# Patient Record
Sex: MALE | Race: WHITE | NOT HISPANIC OR LATINO
[De-identification: names, ages, dates, MRNs, and addresses within clinical notes are randomized per-mention and may not be internally consistent; named-entity substitution may affect disease eponyms.]

---

## 2017-04-24 ENCOUNTER — APPOINTMENT (OUTPATIENT)
Dept: NEUROLOGY | Facility: CLINIC | Age: 57
End: 2017-04-24

## 2017-04-24 VITALS
BODY MASS INDEX: 29.8 KG/M2 | WEIGHT: 220 LBS | HEIGHT: 72 IN | DIASTOLIC BLOOD PRESSURE: 68 MMHG | HEART RATE: 77 BPM | OXYGEN SATURATION: 98 % | SYSTOLIC BLOOD PRESSURE: 130 MMHG

## 2017-04-24 DIAGNOSIS — M54.5 LOW BACK PAIN: ICD-10-CM

## 2017-04-24 DIAGNOSIS — G89.29 LOW BACK PAIN: ICD-10-CM

## 2017-06-13 ENCOUNTER — RX RENEWAL (OUTPATIENT)
Age: 57
End: 2017-06-13

## 2017-07-12 ENCOUNTER — APPOINTMENT (OUTPATIENT)
Dept: PLASTIC SURGERY | Facility: CLINIC | Age: 57
End: 2017-07-12

## 2017-07-12 VITALS
SYSTOLIC BLOOD PRESSURE: 138 MMHG | HEART RATE: 84 BPM | BODY MASS INDEX: 30.24 KG/M2 | WEIGHT: 223 LBS | TEMPERATURE: 98.9 F | DIASTOLIC BLOOD PRESSURE: 87 MMHG

## 2017-07-12 DIAGNOSIS — Z80.1 FAMILY HISTORY OF MALIGNANT NEOPLASM OF TRACHEA, BRONCHUS AND LUNG: ICD-10-CM

## 2017-07-12 DIAGNOSIS — Z86.39 PERSONAL HISTORY OF OTHER ENDOCRINE, NUTRITIONAL AND METABOLIC DISEASE: ICD-10-CM

## 2017-07-12 DIAGNOSIS — Z87.39 PERSONAL HISTORY OF OTHER DISEASES OF THE MUSCULOSKELETAL SYSTEM AND CONNECTIVE TISSUE: ICD-10-CM

## 2017-07-26 ENCOUNTER — APPOINTMENT (OUTPATIENT)
Dept: NEUROLOGY | Facility: CLINIC | Age: 57
End: 2017-07-26

## 2018-07-16 ENCOUNTER — APPOINTMENT (OUTPATIENT)
Dept: ORTHOPEDIC SURGERY | Facility: CLINIC | Age: 58
End: 2018-07-16
Payer: COMMERCIAL

## 2018-07-16 VITALS — WEIGHT: 232 LBS | BODY MASS INDEX: 31.42 KG/M2 | HEIGHT: 72 IN

## 2018-07-16 VITALS — HEART RATE: 75 BPM | DIASTOLIC BLOOD PRESSURE: 79 MMHG | SYSTOLIC BLOOD PRESSURE: 146 MMHG

## 2018-07-16 PROCEDURE — 20550 NJX 1 TENDON SHEATH/LIGAMENT: CPT | Mod: LT

## 2018-07-16 PROCEDURE — 99203 OFFICE O/P NEW LOW 30 MIN: CPT | Mod: 25

## 2018-08-15 ENCOUNTER — APPOINTMENT (OUTPATIENT)
Dept: ORTHOPEDIC SURGERY | Facility: CLINIC | Age: 58
End: 2018-08-15

## 2018-09-07 ENCOUNTER — APPOINTMENT (OUTPATIENT)
Dept: ORTHOPEDIC SURGERY | Facility: CLINIC | Age: 58
End: 2018-09-07
Payer: COMMERCIAL

## 2018-09-07 VITALS
DIASTOLIC BLOOD PRESSURE: 81 MMHG | BODY MASS INDEX: 31.19 KG/M2 | WEIGHT: 230 LBS | HEART RATE: 72 BPM | SYSTOLIC BLOOD PRESSURE: 131 MMHG

## 2018-09-07 PROCEDURE — 20550 NJX 1 TENDON SHEATH/LIGAMENT: CPT | Mod: F5

## 2018-09-07 PROCEDURE — 99213 OFFICE O/P EST LOW 20 MIN: CPT | Mod: 25

## 2018-11-28 ENCOUNTER — APPOINTMENT (OUTPATIENT)
Dept: ORTHOPEDIC SURGERY | Facility: CLINIC | Age: 58
End: 2018-11-28
Payer: COMMERCIAL

## 2018-11-28 PROCEDURE — 99213 OFFICE O/P EST LOW 20 MIN: CPT | Mod: 25

## 2018-11-28 PROCEDURE — 20550 NJX 1 TENDON SHEATH/LIGAMENT: CPT | Mod: F5

## 2019-03-19 ENCOUNTER — APPOINTMENT (OUTPATIENT)
Dept: CARDIOLOGY | Facility: CLINIC | Age: 59
End: 2019-03-19
Payer: COMMERCIAL

## 2019-03-19 ENCOUNTER — NON-APPOINTMENT (OUTPATIENT)
Age: 59
End: 2019-03-19

## 2019-03-19 VITALS
DIASTOLIC BLOOD PRESSURE: 83 MMHG | BODY MASS INDEX: 30.88 KG/M2 | RESPIRATION RATE: 17 BRPM | TEMPERATURE: 97.9 F | HEIGHT: 72 IN | HEART RATE: 85 BPM | SYSTOLIC BLOOD PRESSURE: 124 MMHG | WEIGHT: 228 LBS | OXYGEN SATURATION: 96 %

## 2019-03-19 DIAGNOSIS — R00.2 PALPITATIONS: ICD-10-CM

## 2019-03-19 DIAGNOSIS — F41.0 PANIC DISORDER [EPISODIC PAROXYSMAL ANXIETY]: ICD-10-CM

## 2019-03-19 PROCEDURE — 99204 OFFICE O/P NEW MOD 45 MIN: CPT

## 2019-03-19 PROCEDURE — 93000 ELECTROCARDIOGRAM COMPLETE: CPT

## 2019-03-19 PROCEDURE — 93270 REMOTE 30 DAY ECG REV/REPORT: CPT

## 2019-03-19 NOTE — PHYSICAL EXAM
[General Appearance - Well Developed] : well developed [Normal Appearance] : normal appearance [Well Groomed] : well groomed [General Appearance - Well Nourished] : well nourished [No Deformities] : no deformities [General Appearance - In No Acute Distress] : no acute distress [Normal Conjunctiva] : the conjunctiva exhibited no abnormalities [Eyelids - No Xanthelasma] : the eyelids demonstrated no xanthelasmas [No Oral Pallor] : no oral pallor [No Oral Cyanosis] : no oral cyanosis [FreeTextEntry1] : JVp normal [Heart Rate And Rhythm] : heart rate and rhythm were normal [Murmurs] : no murmurs present [Heart Sounds] : normal S1 and S2 [Edema] : no peripheral edema present [Respiration, Rhythm And Depth] : normal respiratory rhythm and effort [Exaggerated Use Of Accessory Muscles For Inspiration] : no accessory muscle use [Auscultation Breath Sounds / Voice Sounds] : lungs were clear to auscultation bilaterally [] : no hepato-splenomegaly [Abdomen Soft] : soft [Abdomen Tenderness] : non-tender [Abnormal Walk] : normal gait [Abdomen Mass (___ Cm)] : no abdominal mass palpated [Gait - Sufficient For Exercise Testing] : the gait was sufficient for exercise testing [Nail Clubbing] : no clubbing of the fingernails [Cyanosis, Localized] : no localized cyanosis [Petechial Hemorrhages (___cm)] : no petechial hemorrhages

## 2019-03-19 NOTE — DISCUSSION/SUMMARY
[Patient] : the patient [Risks] : risks [Benefits] : benefits [Alternatives] : alternatives [FreeTextEntry1] : Recommend event recorder. Reduction of caffeine. Consider apple watch or similar device followup after above.

## 2019-03-19 NOTE — HISTORY OF PRESENT ILLNESS
[FreeTextEntry1] : Patient has a history of hypertension. He has anxiety and phobias a lot of stress. He drinks caffeine. 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 Denies chest pain or shortness of breath but complains that he has had episodes where his heart feels like butterflies and racing. There is no associated syncope or dizziness. He was evaluated by his PMD. Echocardiography which was essentially normal.

## 2019-03-19 NOTE — REVIEW OF SYSTEMS
[see HPI] : see HPI [Palpitations] : palpitations [Anxiety] : anxiety [Under Stress] : under stress [Negative] : Heme/Lymph

## 2019-12-03 ENCOUNTER — APPOINTMENT (OUTPATIENT)
Dept: CARDIOLOGY | Facility: CLINIC | Age: 59
End: 2019-12-03
Payer: COMMERCIAL

## 2019-12-03 ENCOUNTER — NON-APPOINTMENT (OUTPATIENT)
Age: 59
End: 2019-12-03

## 2019-12-03 VITALS
WEIGHT: 230 LBS | SYSTOLIC BLOOD PRESSURE: 133 MMHG | HEART RATE: 90 BPM | DIASTOLIC BLOOD PRESSURE: 88 MMHG | OXYGEN SATURATION: 98 % | BODY MASS INDEX: 31.15 KG/M2 | RESPIRATION RATE: 17 BRPM | HEIGHT: 72 IN

## 2019-12-03 PROCEDURE — 99215 OFFICE O/P EST HI 40 MIN: CPT

## 2019-12-03 PROCEDURE — 93000 ELECTROCARDIOGRAM COMPLETE: CPT

## 2019-12-03 NOTE — REVIEW OF SYSTEMS
[see HPI] : see HPI [Anxiety] : anxiety [Palpitations] : palpitations [Under Stress] : under stress [Negative] : Endocrine

## 2019-12-03 NOTE — DISCUSSION/SUMMARY
[Patient] : the patient [Risks] : risks [Benefits] : benefits [Alternatives] : alternatives [___ Month(s)] : [unfilled] month(s) [FreeTextEntry1] : Extended discussion with patient and wife. Discussed risks and benefits of oral anticoagulation. For now will remain on aspirin. Discussed risk factors for atrial fibrillation and lifestyle issues. Discussed referral for sleep study. Blood pressure control and weight loss.\par \par Also discussed pill in the pocket, antiarrhythmic drugs and ablation as possible future options. Questions addressed with patient and wife. Nuclear stress requested. Followup 2 months.

## 2019-12-03 NOTE — ASSESSMENT
[FreeTextEntry1] : 58-year-old man with new diagnosis of paroxysmal atrial fib. Hypertension. Obesity. Consider possibility of LAUREN.

## 2019-12-03 NOTE — PHYSICAL EXAM
[General Appearance - Well Developed] : well developed [Normal Appearance] : normal appearance [General Appearance - Well Nourished] : well nourished [Well Groomed] : well groomed [No Deformities] : no deformities [General Appearance - In No Acute Distress] : no acute distress [Normal Conjunctiva] : the conjunctiva exhibited no abnormalities [Eyelids - No Xanthelasma] : the eyelids demonstrated no xanthelasmas [No Oral Pallor] : no oral pallor [No Oral Cyanosis] : no oral cyanosis [Exaggerated Use Of Accessory Muscles For Inspiration] : no accessory muscle use [FreeTextEntry1] : JVp normal [Respiration, Rhythm And Depth] : normal respiratory rhythm and effort [Heart Rate And Rhythm] : heart rate and rhythm were normal [Auscultation Breath Sounds / Voice Sounds] : lungs were clear to auscultation bilaterally [Heart Sounds] : normal S1 and S2 [Murmurs] : no murmurs present [Edema] : no peripheral edema present [Abdomen Tenderness] : non-tender [Abdomen Soft] : soft [] : no hepato-splenomegaly [Abnormal Walk] : normal gait [Abdomen Mass (___ Cm)] : no abdominal mass palpated [Gait - Sufficient For Exercise Testing] : the gait was sufficient for exercise testing [Nail Clubbing] : no clubbing of the fingernails [Cyanosis, Localized] : no localized cyanosis [Petechial Hemorrhages (___cm)] : no petechial hemorrhages

## 2019-12-03 NOTE — REASON FOR VISIT
[Atrial Fibrillation] : atrial fibrillation [Follow-Up - From Hospitalization] : follow-up of a recent hospitalization for [Spouse] : spouse

## 2019-12-03 NOTE — HISTORY OF PRESENT ILLNESS
[FreeTextEntry1] : Developed palpitations was seen at a walk-in clinic referred to [facility]. Diagnosed with atrophic. Apparently reverted spontaneously.\par \par An echo with normal LV function possible inferolateral hypertrophy. Discharged with cortisone and aspirin see medication list.\par \mark Did have alcohol intermittently and periodically, but says is not a regular drinker.\par \par On questioning does have snoring at night and fatigue and tiredness during the day. Never diagnosed with sleep apnea.\par \par No chest pain or shortness of breath.\par \par Intermittent palpitations as was discussed during his previous visit with me.\mark \mark Now has an Apple watch so he can make recordings.

## 2019-12-16 ENCOUNTER — APPOINTMENT (OUTPATIENT)
Dept: CARDIOLOGY | Facility: CLINIC | Age: 59
End: 2019-12-16

## 2019-12-30 ENCOUNTER — APPOINTMENT (OUTPATIENT)
Dept: CARDIOLOGY | Facility: CLINIC | Age: 59
End: 2019-12-30
Payer: COMMERCIAL

## 2019-12-30 PROCEDURE — 93015 CV STRESS TEST SUPVJ I&R: CPT

## 2020-02-04 ENCOUNTER — APPOINTMENT (OUTPATIENT)
Dept: CARDIOLOGY | Facility: CLINIC | Age: 60
End: 2020-02-04

## 2020-03-05 ENCOUNTER — NON-APPOINTMENT (OUTPATIENT)
Age: 60
End: 2020-03-05

## 2020-03-05 ENCOUNTER — APPOINTMENT (OUTPATIENT)
Dept: CARDIOLOGY | Facility: CLINIC | Age: 60
End: 2020-03-05
Payer: COMMERCIAL

## 2020-03-05 VITALS
BODY MASS INDEX: 30.48 KG/M2 | DIASTOLIC BLOOD PRESSURE: 76 MMHG | SYSTOLIC BLOOD PRESSURE: 133 MMHG | HEIGHT: 72 IN | RESPIRATION RATE: 17 BRPM | OXYGEN SATURATION: 98 % | WEIGHT: 225 LBS | HEART RATE: 70 BPM

## 2020-03-05 PROCEDURE — 93000 ELECTROCARDIOGRAM COMPLETE: CPT

## 2020-03-05 PROCEDURE — 99214 OFFICE O/P EST MOD 30 MIN: CPT

## 2020-03-05 NOTE — ASSESSMENT
[FreeTextEntry1] : 59-year-old man with  paroxysmal atrial fib. Hypertension. Obesity. Considering  LAUREN work up.\par

## 2020-03-05 NOTE — HISTORY OF PRESENT ILLNESS
[FreeTextEntry1] : As per prior phone call had episode of initial fibrillation after dietary indiscretion and some alcohol. Episode lasted for 14 hours self resolved. He felt a little lightheaded when it happens no chest pain or shortness of breath no recurrence since that time. Has seen his primary physician blood pressure was normal at her office in the one teens systolic.

## 2020-03-05 NOTE — REASON FOR VISIT
[Follow-Up - From Hospitalization] : follow-up of a recent hospitalization for [Atrial Fibrillation] : atrial fibrillation [Hypertension] : hypertension [Spouse] : spouse

## 2020-03-05 NOTE — DISCUSSION/SUMMARY
[Patient] : the patient [Risks] : risks [Benefits] : benefits [Alternatives] : alternatives [___ Month(s)] : [unfilled] month(s) [FreeTextEntry1] : Discussed with patient and wife regarding atrial fibrillation additional p.r.n. medication for this he has A. fib episodes reduction of alcohol blood pressure control and workup for sleep apnea or questions addressed discussed as well issues related to antiarrhythmics ablation cardioversion. Previously discussed issues related to anticoagulation. Followup in several months.

## 2020-03-16 ENCOUNTER — APPOINTMENT (OUTPATIENT)
Dept: PULMONOLOGY | Facility: CLINIC | Age: 60
End: 2020-03-16

## 2020-04-14 ENCOUNTER — APPOINTMENT (OUTPATIENT)
Dept: CARDIOLOGY | Facility: CLINIC | Age: 60
End: 2020-04-14

## 2020-06-09 ENCOUNTER — APPOINTMENT (OUTPATIENT)
Dept: CARDIOLOGY | Facility: CLINIC | Age: 60
End: 2020-06-09
Payer: COMMERCIAL

## 2020-06-09 ENCOUNTER — NON-APPOINTMENT (OUTPATIENT)
Age: 60
End: 2020-06-09

## 2020-06-09 VITALS
DIASTOLIC BLOOD PRESSURE: 80 MMHG | BODY MASS INDEX: 31.15 KG/M2 | OXYGEN SATURATION: 94 % | RESPIRATION RATE: 17 BRPM | SYSTOLIC BLOOD PRESSURE: 132 MMHG | HEART RATE: 70 BPM | HEIGHT: 72 IN | TEMPERATURE: 97 F | WEIGHT: 230 LBS

## 2020-06-09 PROCEDURE — 93000 ELECTROCARDIOGRAM COMPLETE: CPT

## 2020-06-09 PROCEDURE — 99214 OFFICE O/P EST MOD 30 MIN: CPT

## 2020-06-09 NOTE — HISTORY OF PRESENT ILLNESS
[FreeTextEntry1] : Occasional racing heart, usually at night, AF by Apple watch. Self resolve. Feels anxious, no c/p dyspnea, syncope. Takes Cardizem.\par Admits to dietary non compliance, occasional ETOH. No LAUREN evaluation yet.

## 2020-06-09 NOTE — PHYSICAL EXAM
[Normal Appearance] : normal appearance [General Appearance - Well Developed] : well developed [Well Groomed] : well groomed [No Deformities] : no deformities [General Appearance - Well Nourished] : well nourished [General Appearance - In No Acute Distress] : no acute distress [Normal Conjunctiva] : the conjunctiva exhibited no abnormalities [Eyelids - No Xanthelasma] : the eyelids demonstrated no xanthelasmas [No Oral Pallor] : no oral pallor [No Oral Cyanosis] : no oral cyanosis [FreeTextEntry1] : JVP normal [Respiration, Rhythm And Depth] : normal respiratory rhythm and effort [Exaggerated Use Of Accessory Muscles For Inspiration] : no accessory muscle use [Auscultation Breath Sounds / Voice Sounds] : lungs were clear to auscultation bilaterally [Heart Sounds] : normal S1 and S2 [Heart Rate And Rhythm] : heart rate and rhythm were normal [Murmurs] : no murmurs present [Edema] : no peripheral edema present [Abdomen Soft] : soft [Abdomen Tenderness] : non-tender [] : no hepato-splenomegaly [Abnormal Walk] : normal gait [Abdomen Mass (___ Cm)] : no abdominal mass palpated [Nail Clubbing] : no clubbing of the fingernails [Gait - Sufficient For Exercise Testing] : the gait was sufficient for exercise testing [Cyanosis, Localized] : no localized cyanosis [Petechial Hemorrhages (___cm)] : no petechial hemorrhages

## 2020-06-09 NOTE — DISCUSSION/SUMMARY
[Patient] : the patient [Benefits] : benefits [Risks] : risks [Alternatives] : alternatives [___ Month(s)] : [unfilled] month(s) [FreeTextEntry1] : d/w patient regarding PAF, additional Cardizem dosing PRN, ER visit if still symptomatic.\par D/w patient regarding AAD therapy and also option of ablation.\par Discussed oral a/c his CH ADS VASC score is 1, and he is reluctant to take oral a/c.\par weight loss/LAUREN evaluation again recommended.\par f/u 4 months

## 2020-06-09 NOTE — ASSESSMENT
[FreeTextEntry1] : 59-year-old man with  paroxysmal atrial fib. Hypertension. Obesity. Anxiety.\par

## 2020-06-09 NOTE — REASON FOR VISIT
[Follow-Up - From Hospitalization] : follow-up of a recent hospitalization for [Atrial Fibrillation] : atrial fibrillation [Hypertension] : hypertension

## 2020-06-15 ENCOUNTER — RX RENEWAL (OUTPATIENT)
Age: 60
End: 2020-06-15

## 2020-06-22 NOTE — PHYSICAL EXAM
[General Appearance - Well Developed] : well developed [Normal Appearance] : normal appearance [Well Groomed] : well groomed [General Appearance - Well Nourished] : well nourished [No Deformities] : no deformities [General Appearance - In No Acute Distress] : no acute distress [Normal Conjunctiva] : the conjunctiva exhibited no abnormalities [Eyelids - No Xanthelasma] : the eyelids demonstrated no xanthelasmas Monthly or less [No Oral Pallor] : no oral pallor [No Oral Cyanosis] : no oral cyanosis [Respiration, Rhythm And Depth] : normal respiratory rhythm and effort [Exaggerated Use Of Accessory Muscles For Inspiration] : no accessory muscle use [Auscultation Breath Sounds / Voice Sounds] : lungs were clear to auscultation bilaterally [Heart Rate And Rhythm] : heart rate and rhythm were normal [Heart Sounds] : normal S1 and S2 [Murmurs] : no murmurs present [Edema] : no peripheral edema present [Abdomen Soft] : soft [Abdomen Tenderness] : non-tender [] : no hepato-splenomegaly [Abdomen Mass (___ Cm)] : no abdominal mass palpated [Abnormal Walk] : normal gait [Gait - Sufficient For Exercise Testing] : the gait was sufficient for exercise testing [Nail Clubbing] : no clubbing of the fingernails [Cyanosis, Localized] : no localized cyanosis [Petechial Hemorrhages (___cm)] : no petechial hemorrhages [FreeTextEntry1] : JVp normal

## 2020-07-27 ENCOUNTER — APPOINTMENT (OUTPATIENT)
Dept: PULMONOLOGY | Facility: CLINIC | Age: 60
End: 2020-07-27
Payer: COMMERCIAL

## 2020-07-27 DIAGNOSIS — R06.83 SNORING: ICD-10-CM

## 2020-07-27 PROCEDURE — 99203 OFFICE O/P NEW LOW 30 MIN: CPT | Mod: GC,95

## 2020-07-27 NOTE — REASON FOR VISIT
[Initial Evaluation] : an initial evaluation [Medical Office: ([facility])___] : at the medical office located in  [Verbal consent obtained from patient] : the patient, [unfilled] [Home] : at home, [unfilled] , at the time of the visit.

## 2020-08-05 NOTE — REVIEW OF SYSTEMS
[Snoring] : snoring [A.M. Dry Mouth] : a.m. dry mouth [Palpitations] : palpitations [Chest Pain] : chest pain [Obesity] : obesity [Nocturia] : nocturia [Anxious] : anxious [Negative] : Psychiatric [Recent Wt Gain (___ Lbs)] : no recent weight gain [Witnessed Apneas] : no witnessed apnea [Diabetes] : no diabetes  [Thyroid Disease] : no thyroid disease [Shortness Of Breath] : no shortness of breath [Depression] : no depression [A.M. Headache] : no headache present upon awakening [Difficulty Initiating Sleep] : no difficulty falling asleep [Difficulty Maintaining Sleep] : no difficulty maintaining sleep [Acute Insomnia] : no acute insomnia [Irresistible urge to move legs] : no irresistible urge to move legs because of lower extremity discomfort [Chronic Insomnia] : no chronic  insomnia [Lower Extremity Discomfort] : no lower extremity discomfort [Hypersomnolence] : not sleeping much more than usual [Sleep Disturbances due to LE symptoms] : ~T no sleep disturbances due to lower extremity symptoms [Late day/ Evening symptoms] : no late day/evening symptoms [Unusual Sleep Behavior] : no unusual sleep behavior [Cataplexy] :  no cataplexy

## 2020-08-05 NOTE — ASSESSMENT
[FreeTextEntry1] : 59M hx Afib, HTN, HLD, obesity (BMI 31), who was referred by cardiology (Dr. Rider) because of pAfib. Diagnosed in 11/2019 after experiencing heart palpitations. Has hx of obesity, heavy snoring, and waking in the middle of the night with heart palpitations (Apple Watch confirms Afib). No EDS but takes daily nap for about 30 min everyday, feels refreshed afterwards. ESS 5. Given history of Atrial fibrillation especially waking him in the middle of the night, obesity and snoring, will need to rule out apneas as cause for arousals and subsequent Afib with in lab PSG. uncontrolled robles may contribute to afib recurrence.  \par The [facility] diagnostic polysomnography for further assessment. The ramifications of obstructive sleep apnea and its potential therapeutic modalities were discussed with the patient. The patient will followup after results of this study are available. \par \par \par Tracey Paulson MD\par Sleep Fellow

## 2020-08-05 NOTE — HISTORY OF PRESENT ILLNESS
[Snoring] : snoring [Frequent Nocturnal Awakening] : frequent nocturnal awakening [Daytime Somnolence] : daytime somnolence [Awakening With Dry Mouth] : awakening with dry mouth [ESS: ___] : ESS score [unfilled] [FreeTextEntry1] : 59M hx Afib, HTN, HLD, obesity (BMI 31), who was referred by cardiology (Dr. Rider) because of pAfib. 9/2019 had episodes of heart palpitations. Around Thanksgiving 2019, pt experienced persistent heart palpitations and went to  then admitted to hospital for 3 days, diagnosed with Afib. Started on diltiazem. Occasionally feels palpitations that wakes him up at night. Wife notes that he snores, no witnessed apneic episodes. \par \par Meds: asa, atorva, dilt, fenofibrate, losartan\par \par Sleep schedule: 1AM to bed, sleep latency <10 min, 1-2 nighttime awakenings - nocturia, wakes up at 6:30-7AM, takes 1 naps during the day for about 1/3 hour - feels refreshed afterwards; somewhat restorative sleep. On average sleeps 4-5 hours a night. No AM headaches. Wakes up with dry mouth, snoring, no witnessed apneic episodes. No choking/gasping at night but wakes up in the middle of the night with palpitations - Afib confirmed by Apple Watch. 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.\par \par Echo 3/2019: LA 3.6, normal LV, global sys function, EF 60-65%, normal RVSP, normal diastolic function [Witnessed Apneas] : no witnessed sleep apnea [Unintentional Sleep while Active] : no unintentional sleep while active [Unintentional Sleep While Inactive] : no unintentional sleep while inactive [Awakes Unrefreshed] : does not awake unrefreshed [Awakes with Headache] : no headache upon awakening [DIS] : no DIS [DMS] : no DMS [Recent  Weight Gain] : no recent weight gain [Hypersomnolence] : no hypersomnolence [Unusual Sleep Behavior] : no unusual sleep behavior [Sleep Paralysis] : no sleep paralysis [Cataplexy] : no cataplexy [Hypnopompic Hallucinations] : no hallucinations when awakening [Lower Extremity Discomfort] : no lower extremity discomfort in evening or at bedtime [Hypnagogic Hallucinations] : no hallucinations when falling asleep

## 2020-09-03 DIAGNOSIS — Z01.818 ENCOUNTER FOR OTHER PREPROCEDURAL EXAMINATION: ICD-10-CM

## 2020-09-05 ENCOUNTER — APPOINTMENT (OUTPATIENT)
Dept: DISASTER EMERGENCY | Facility: CLINIC | Age: 60
End: 2020-09-05

## 2020-09-06 LAB — SARS-COV-2 N GENE NPH QL NAA+PROBE: NOT DETECTED

## 2020-09-07 ENCOUNTER — FORM ENCOUNTER (OUTPATIENT)
Age: 60
End: 2020-09-07

## 2020-09-21 ENCOUNTER — NON-APPOINTMENT (OUTPATIENT)
Age: 60
End: 2020-09-21

## 2020-10-06 ENCOUNTER — NON-APPOINTMENT (OUTPATIENT)
Age: 60
End: 2020-10-06

## 2020-10-06 ENCOUNTER — APPOINTMENT (OUTPATIENT)
Dept: CARDIOLOGY | Facility: CLINIC | Age: 60
End: 2020-10-06
Payer: COMMERCIAL

## 2020-10-06 VITALS
DIASTOLIC BLOOD PRESSURE: 78 MMHG | OXYGEN SATURATION: 94 % | RESPIRATION RATE: 17 BRPM | HEIGHT: 72 IN | HEART RATE: 68 BPM | SYSTOLIC BLOOD PRESSURE: 134 MMHG | BODY MASS INDEX: 29.93 KG/M2 | WEIGHT: 221 LBS

## 2020-10-06 PROCEDURE — 99214 OFFICE O/P EST MOD 30 MIN: CPT

## 2020-10-06 PROCEDURE — 93000 ELECTROCARDIOGRAM COMPLETE: CPT

## 2020-10-06 NOTE — REVIEW OF SYSTEMS
[Recent Weight Loss (___ Lbs)] : recent [unfilled] ~Ulb weight loss [see HPI] : see HPI [Palpitations] : palpitations [Anxiety] : anxiety [Under Stress] : under stress [Negative] : Heme/Lymph

## 2020-10-06 NOTE — DISCUSSION/SUMMARY
[Patient] : the patient [Risks] : risks [Benefits] : benefits [Alternatives] : alternatives [___ Week(s)] : [unfilled] week(s) [FreeTextEntry1] : d/w patient regarding PAF, additional Cardizem dosing PRN, ER visit if still symptomatic.\par D/w patient regarding AAD therapy and also option of ablation.\par Discussed oral a/c his CH ADS VASC score is 1, and he is reluctant to take oral a/c.\par weight loss/  sleep f/u tomorrow\par

## 2020-10-06 NOTE — PHYSICAL EXAM
[General Appearance - Well Developed] : well developed [Normal Appearance] : normal appearance [Well Groomed] : well groomed [General Appearance - Well Nourished] : well nourished [No Deformities] : no deformities [General Appearance - In No Acute Distress] : no acute distress [Normal Conjunctiva] : the conjunctiva exhibited no abnormalities [Eyelids - No Xanthelasma] : the eyelids demonstrated no xanthelasmas [FreeTextEntry1] : JVP normal [Respiration, Rhythm And Depth] : normal respiratory rhythm and effort [Exaggerated Use Of Accessory Muscles For Inspiration] : no accessory muscle use [Auscultation Breath Sounds / Voice Sounds] : lungs were clear to auscultation bilaterally [Heart Rate And Rhythm] : heart rate and rhythm were normal [Heart Sounds] : normal S1 and S2 [Murmurs] : no murmurs present [Edema] : no peripheral edema present [Abdomen Soft] : soft [Abdomen Tenderness] : non-tender [] : no hepato-splenomegaly [Abdomen Mass (___ Cm)] : no abdominal mass palpated [Abnormal Walk] : normal gait [Gait - Sufficient For Exercise Testing] : the gait was sufficient for exercise testing [Nail Clubbing] : no clubbing of the fingernails [Cyanosis, Localized] : no localized cyanosis [Petechial Hemorrhages (___cm)] : no petechial hemorrhages

## 2020-10-06 NOTE — ASSESSMENT
[FreeTextEntry1] : 59-year-old man with  paroxysmal atrial fib. Hypertension. Obesity. Anxiety. LAUREN\par

## 2020-10-06 NOTE — HISTORY OF PRESENT ILLNESS
[FreeTextEntry1] : Multiple episodes of af, racing heart, usually at night. Last 1 minutes up to mulitple hours. Taking extra cardizem helps.\par No etoh.\par no c/p, sob\par having f/u tomorrow regarding LAUREN

## 2020-10-07 ENCOUNTER — APPOINTMENT (OUTPATIENT)
Dept: PULMONOLOGY | Facility: CLINIC | Age: 60
End: 2020-10-07
Payer: COMMERCIAL

## 2020-10-07 VITALS
SYSTOLIC BLOOD PRESSURE: 127 MMHG | RESPIRATION RATE: 17 BRPM | HEART RATE: 73 BPM | WEIGHT: 222.06 LBS | BODY MASS INDEX: 30.12 KG/M2 | TEMPERATURE: 98.1 F | DIASTOLIC BLOOD PRESSURE: 75 MMHG | OXYGEN SATURATION: 97 %

## 2020-10-07 PROCEDURE — 99214 OFFICE O/P EST MOD 30 MIN: CPT | Mod: GC

## 2020-10-08 NOTE — ASSESSMENT
[FreeTextEntry1] : 59M hx Afib, HTN, HLD, obesity (BMI 31), who comes for follow up of sleep results. PSG shows moderate sleep apnea with intermittent episodes of Afib.  He reports that most of his occurrences of atrial fibrillation have developed during the night.  Therefore because of his history of obstructive sleep apnea and its association with atrial fibrillation, pt would benefit from PAP therapy. The patient was referred to the [facility] sleep disorder center at [facility] for CPAP titration. The ramifications of obstructive sleep apnea and its potential therapeutic modalities were discussed with the patient. The dangers of drowsy driving were discussed with the patient. The patient was warned to avoid drowsy driving. The patient will followup after results of this study are available.

## 2020-10-08 NOTE — ASSESSMENT
[FreeTextEntry1] : 59M hx Afib, HTN, HLD, obesity (BMI 31), who comes for follow up of sleep results. PSG shows moderate sleep apnea with intermittent episodes of Afib.  He reports that most of his occurrences of atrial fibrillation have developed during the night.  Therefore because of his history of obstructive sleep apnea and its association with atrial fibrillation, pt would benefit from PAP therapy. The patient was referred to the [facility] sleep disorder center at [facility] for CPAP titration. The ramifications of obstructive sleep apnea and its potential therapeutic modalities were discussed with the patient. The dangers of drowsy driving were discussed with the patient. The patient was warned to avoid drowsy driving. The patient will followup after results of this study are available.

## 2020-10-08 NOTE — HISTORY OF PRESENT ILLNESS
[Spouse] : spouse [Obstructive Sleep Apnea] : obstructive sleep apnea [de-identified] : 58 yo M with history of Afib, HTN, HLD, obesity (BMI of 31) presented for follow-up after sleep study. \par He noted that he continued to awake several times at night, to urinate or with Afib. He did not have trouble falling asleep. He often took a small (~30 minutes) nap around 8 PM and went to bed a 1:30 AM. Last anxiety attack was 1 month ago. He still had some anxiety at times but less than when he was younger and he does not want to be on medication for anxiety.  [FreeTextEntry1] : 59M hx Afib, HTN, HLD, obesity (BMI 31), who comes for follow up of PSG results. Last seen 7/27/2020 as televisit. He was initial referred by cardiology because of paroxysmal Afib. Had sleep study to r/o uncontrolled LAUREN that may be contributing to Afib recurrence.  He reports that most episodes of atrial fibrillation have awakened him during the night.  Continues to have nocturia. [place] that he slept ok at the sleep lab. Usually delayed sleeper - goes to bed at around 1:30AM and gets up at 7AM. Naps at around 8PM. \par \par PSG 9/8/2020: AHI 20, mostly obstructive, hypopneas, T90 2%, EKG with brief periods of Afib

## 2020-10-08 NOTE — HISTORY OF PRESENT ILLNESS
[Spouse] : spouse [Obstructive Sleep Apnea] : obstructive sleep apnea [de-identified] : 58 yo M with history of Afib, HTN, HLD, obesity (BMI of 31) presented for follow-up after sleep study. \par He noted that he continued to awake several times at night, to urinate or with Afib. He did not have trouble falling asleep. He often took a small (~30 minutes) nap around 8 PM and went to bed a 1:30 AM. Last anxiety attack was 1 month ago. He still had some anxiety at times but less than when he was younger and he does not want to be on medication for anxiety.  [FreeTextEntry1] : 59M hx Afib, HTN, HLD, obesity (BMI 31), who comes for follow up of PSG results. Last seen 7/27/2020 as televisit. He was initial referred by cardiology because of paroxysmal Afib. Had sleep study to r/o uncontrolled LAUREN that may be contributing to Afib recurrence.  He reports that most episodes of atrial fibrillation have awakened him during the night.  Continues to have nocturia. [place] that he slept ok at the sleep lab. Usually delayed sleeper - goes to bed at around 1:30AM and gets up at 7AM. Naps at around 8PM. \par \par PSG 9/8/2020: AHI 20, mostly obstructive, hypopneas, T90 2%, EKG with brief periods of Afib

## 2020-10-08 NOTE — REVIEW OF SYSTEMS
[Snoring] : snoring [A.M. Dry Mouth] : a.m. dry mouth [Chest Pain] : chest pain [Palpitations] : palpitations [Obesity] : obesity [Nocturia] : nocturia [Anxious] : anxious [Negative] : Psychiatric [EDS: ESS=____] : no daytime somnolence [Fatigue] : no fatigue [Recent Wt Gain (___ Lbs)] : no recent weight gain [Witnessed Apneas] : no witnessed apnea [Shortness Of Breath] : no shortness of breath [Thyroid Disease] : no thyroid disease [Diabetes] : no diabetes  [A.M. Headache] : no headache present upon awakening [Depression] : no depression [Difficulty Initiating Sleep] : no difficulty falling asleep [Difficulty Maintaining Sleep] : no difficulty maintaining sleep [Acute Insomnia] : no acute insomnia [Chronic Insomnia] : no chronic  insomnia [Lower Extremity Discomfort] : no lower extremity discomfort [Irresistible urge to move legs] : no irresistible urge to move legs because of lower extremity discomfort [Late day/ Evening symptoms] : no late day/evening symptoms [Sleep Disturbances due to LE symptoms] : ~T no sleep disturbances due to lower extremity symptoms [Unusual Sleep Behavior] : no unusual sleep behavior [Hypersomnolence] : not sleeping much more than usual [Cataplexy] :  no cataplexy

## 2020-10-08 NOTE — PHYSICAL EXAM
[No Acute Distress] : no acute distress [No JVD] : no jugular venous distention [No Respiratory Distress] : no respiratory distress  [Normal Rate] : normal rate  [General Appearance - Well Developed] : well developed [General Appearance - Well Nourished] : well nourished [Neck Appearance] : the appearance of the neck was normal [Heart Rate And Rhythm] : heart rate was normal and rhythm regular [Heart Sounds] : normal S1 and S2 [] : no respiratory distress [Auscultation Breath Sounds / Voice Sounds] : lungs were clear to auscultation bilaterally [Bowel Sounds] : normal bowel sounds [Abdomen Soft] : soft [Abdomen Tenderness] : non-tender [Abnormal Walk] : normal gait [Nail Clubbing] : no clubbing of the fingernails [Cyanosis, Localized] : no localized cyanosis [Skin Color & Pigmentation] : normal skin color and pigmentation [Motor Exam] : the motor exam was normal [No Focal Deficits] : no focal deficits [Oriented To Time, Place, And Person] : oriented to person, place, and time

## 2020-11-24 ENCOUNTER — APPOINTMENT (OUTPATIENT)
Dept: CARDIOLOGY | Facility: CLINIC | Age: 60
End: 2020-11-24
Payer: COMMERCIAL

## 2020-11-24 VITALS
HEART RATE: 79 BPM | BODY MASS INDEX: 30.34 KG/M2 | WEIGHT: 224 LBS | RESPIRATION RATE: 16 BRPM | SYSTOLIC BLOOD PRESSURE: 128 MMHG | HEIGHT: 72 IN | OXYGEN SATURATION: 95 % | DIASTOLIC BLOOD PRESSURE: 74 MMHG | TEMPERATURE: 97.9 F

## 2020-11-24 PROCEDURE — 99214 OFFICE O/P EST MOD 30 MIN: CPT

## 2020-11-24 NOTE — REVIEW OF SYSTEMS
[Recent Weight Loss (___ Lbs)] : no recent weight loss [see HPI] : see HPI [Palpitations] : palpitations [Anxiety] : anxiety [Under Stress] : under stress [Negative] : Heme/Lymph

## 2020-11-24 NOTE — DISCUSSION/SUMMARY
[Patient] : the patient [Risks] : risks [Benefits] : benefits [Alternatives] : alternatives [___ Month(s)] : [unfilled] month(s) [FreeTextEntry1] : d/w patient regarding PAF, additional Cardizem dosing PRN, ER visit if still symptomatic.\par D/w patient regarding AAD therapy and also option of ablation.\par Discussed oral a/c his CH ADS VASC score is 1, and he is reluctant to take oral a/c.\par Doesn't want b blocker.\par Risks benefits and alternatives discussed with the patient questions addressed.\par Short acting cardizem prn. Record episodes on Apple watch.\par Start Multaq, rv 1 month.\par \par

## 2020-11-24 NOTE — HISTORY OF PRESENT ILLNESS
[FreeTextEntry1] : Goes for weeks without episodes, then has af episodes that can last hours, often but not always at night.\par No c/p synope, sob.\par Has sleep evaluation.\par Occasional brief "butterflies" sensation too.\par will take extra LA cardizem as needed.\par No etoh.

## 2020-12-22 ENCOUNTER — APPOINTMENT (OUTPATIENT)
Dept: CARDIOLOGY | Facility: CLINIC | Age: 60
End: 2020-12-22
Payer: COMMERCIAL

## 2020-12-22 ENCOUNTER — NON-APPOINTMENT (OUTPATIENT)
Age: 60
End: 2020-12-22

## 2020-12-22 VITALS
DIASTOLIC BLOOD PRESSURE: 78 MMHG | BODY MASS INDEX: 31.15 KG/M2 | TEMPERATURE: 97.6 F | HEART RATE: 72 BPM | OXYGEN SATURATION: 97 % | WEIGHT: 230 LBS | SYSTOLIC BLOOD PRESSURE: 127 MMHG | RESPIRATION RATE: 17 BRPM | HEIGHT: 72 IN

## 2020-12-22 PROCEDURE — 93000 ELECTROCARDIOGRAM COMPLETE: CPT

## 2020-12-22 PROCEDURE — 99072 ADDL SUPL MATRL&STAF TM PHE: CPT

## 2020-12-22 PROCEDURE — 99214 OFFICE O/P EST MOD 30 MIN: CPT

## 2020-12-22 NOTE — DISCUSSION/SUMMARY
[Patient] : the patient [Risks] : risks [Benefits] : benefits [Alternatives] : alternatives [___ Month(s)] : [unfilled] month(s) [FreeTextEntry1] : \par D/w patient regarding AAD therapy and also option of ablation.\par Discussed oral a/c his CH ADS VASC score is 1, and he is reluctant to take oral a/c.\par He wishes to continue current rx until next AF episode, might consider spending $ for multaq or have ablation.\par \par Risks benefits and alternatives discussed with the patient questions addressed.\par \par \par \par

## 2020-12-22 NOTE — REVIEW OF SYSTEMS
[Recent Weight Gain (___ Lbs)] : recent [unfilled] ~Ulb weight gain [Recent Weight Loss (___ Lbs)] : no recent weight loss [see HPI] : see HPI [Palpitations] : palpitations [Anxiety] : anxiety [Under Stress] : under stress [Negative] : Heme/Lymph

## 2020-12-22 NOTE — HISTORY OF PRESENT ILLNESS
[FreeTextEntry1] : Did not take multaq related to cost. Taking 180 mg diltiazem daily.\par 1episode of palpitations/af, self resolved about 2 weeks ago.

## 2021-04-05 ENCOUNTER — RX RENEWAL (OUTPATIENT)
Age: 61
End: 2021-04-05

## 2021-04-20 ENCOUNTER — APPOINTMENT (OUTPATIENT)
Dept: CARDIOLOGY | Facility: CLINIC | Age: 61
End: 2021-04-20
Payer: COMMERCIAL

## 2021-04-20 ENCOUNTER — NON-APPOINTMENT (OUTPATIENT)
Age: 61
End: 2021-04-20

## 2021-04-20 VITALS
OXYGEN SATURATION: 96 % | HEART RATE: 76 BPM | BODY MASS INDEX: 31.69 KG/M2 | DIASTOLIC BLOOD PRESSURE: 81 MMHG | SYSTOLIC BLOOD PRESSURE: 131 MMHG | WEIGHT: 234 LBS | RESPIRATION RATE: 16 BRPM | HEIGHT: 72 IN | TEMPERATURE: 98.1 F

## 2021-04-20 PROCEDURE — 93000 ELECTROCARDIOGRAM COMPLETE: CPT

## 2021-04-20 PROCEDURE — 99072 ADDL SUPL MATRL&STAF TM PHE: CPT

## 2021-04-20 PROCEDURE — 99214 OFFICE O/P EST MOD 30 MIN: CPT

## 2021-04-20 RX ORDER — LOSARTAN POTASSIUM 50 MG/1
50 TABLET, FILM COATED ORAL
Refills: 0 | Status: DISCONTINUED | COMMUNITY
End: 2021-04-20

## 2021-04-20 RX ORDER — ASPIRIN 81 MG/1
81 TABLET, CHEWABLE ORAL
Refills: 0 | Status: DISCONTINUED | COMMUNITY
End: 2021-04-20

## 2021-04-20 NOTE — PHYSICAL EXAM
[General Appearance - Well Developed] : well developed [Normal Appearance] : normal appearance [Well Groomed] : well groomed [General Appearance - Well Nourished] : well nourished [No Deformities] : no deformities [General Appearance - In No Acute Distress] : no acute distress [Normal Conjunctiva] : the conjunctiva exhibited no abnormalities [Eyelids - No Xanthelasma] : the eyelids demonstrated no xanthelasmas [FreeTextEntry1] : JVP normal [Respiration, Rhythm And Depth] : normal respiratory rhythm and effort [Auscultation Breath Sounds / Voice Sounds] : lungs were clear to auscultation bilaterally [Exaggerated Use Of Accessory Muscles For Inspiration] : no accessory muscle use [Heart Rate And Rhythm] : heart rate and rhythm were normal [Heart Sounds] : normal S1 and S2 [Murmurs] : no murmurs present [Edema] : no peripheral edema present [Abdomen Soft] : soft [Abdomen Tenderness] : non-tender [] : no hepato-splenomegaly [Abdomen Mass (___ Cm)] : no abdominal mass palpated [Abnormal Walk] : normal gait [Gait - Sufficient For Exercise Testing] : the gait was sufficient for exercise testing [Nail Clubbing] : no clubbing of the fingernails [Cyanosis, Localized] : no localized cyanosis [Petechial Hemorrhages (___cm)] : no petechial hemorrhages

## 2021-04-20 NOTE — HISTORY OF PRESENT ILLNESS
[FreeTextEntry1] : Seen for follow-up visit with his wife.\par \par Had interval COVID-19 infection initially had monoclonal antibody when was hospitalized had remdesivir treatment and desaturation did not require ICU care.  Did have episodes he believes of A. fib during the hospital stay.\par \par Is taking Multaq as directed gets occasional A. fib episodes.  No chest pain shortness of breath.  Is taking oral anticoagulation now.  Med list updated.

## 2021-04-20 NOTE — DISCUSSION/SUMMARY
[Patient] : the patient [Risks] : risks [Benefits] : benefits [Alternatives] : alternatives [___ Month(s)] : [unfilled] month(s) [FreeTextEntry1] : Reviewed and discussed with patient and wife.\par \par He will now agree to referral to electrophysiologist for consultation consideration of ablation.  He is moderately controlled on antiarrhythmic therapy and is taking anticoagulation at this time.\par \par We will continue same medication at this time cardiologically.\par Risks benefits and alternatives discussed with the patient questions addressed.\par \par \par \par

## 2021-07-20 ENCOUNTER — NON-APPOINTMENT (OUTPATIENT)
Age: 61
End: 2021-07-20

## 2021-07-20 ENCOUNTER — APPOINTMENT (OUTPATIENT)
Dept: CARDIOLOGY | Facility: CLINIC | Age: 61
End: 2021-07-20
Payer: COMMERCIAL

## 2021-07-20 VITALS
DIASTOLIC BLOOD PRESSURE: 75 MMHG | SYSTOLIC BLOOD PRESSURE: 133 MMHG | HEART RATE: 64 BPM | OXYGEN SATURATION: 96 % | HEIGHT: 72 IN | BODY MASS INDEX: 32.51 KG/M2 | WEIGHT: 240 LBS | RESPIRATION RATE: 17 BRPM | TEMPERATURE: 98 F

## 2021-07-20 PROCEDURE — 99214 OFFICE O/P EST MOD 30 MIN: CPT

## 2021-07-20 PROCEDURE — 93000 ELECTROCARDIOGRAM COMPLETE: CPT

## 2021-07-20 NOTE — REVIEW OF SYSTEMS
[Fever] : no fever [Weight Gain (___ Lbs)] : [unfilled] ~Ulb weight gain [Chills] : no chills [Palpitations] : palpitations [Negative] : Respiratory

## 2021-07-20 NOTE — CARDIOLOGY SUMMARY
[de-identified] : July 20, 2021 sinus rhythm [de-identified] : Stress test December 2019 no ischemia [de-identified] : Echo 2019 normal LV function

## 2021-07-20 NOTE — REASON FOR VISIT
[Arrhythmia/ECG Abnorrmalities] : arrhythmia/ECG abnormalities [Hypertension] : hypertension [Spouse] : spouse

## 2021-07-20 NOTE — HISTORY OF PRESENT ILLNESS
[FreeTextEntry1] : Patient seen for follow-up accompanied by his wife.  Has been under a lot of stress related to family situation.  Reports episodes of A. fib about 4 times in the past 6 weeks longest episodes last 1 to 2 hours.  He feels generally poorly during those episodes which self terminated.\par \par No chest pain shortness of breath.\par \par Has been active physically at work but admits to weight gain and dietary noncompliance.  Has not had follow-up regarding his sleep apnea.

## 2021-07-20 NOTE — ASSESSMENT
[FreeTextEntry1] : Paroxysmal atrial fibrillation on Multaq as antiarrhythmic therapy and Eliquis as anticoagulant.  Hypertension reasonable control.  Overweight.  Sleep apnea.

## 2021-07-20 NOTE — DISCUSSION/SUMMARY
[Risks] : risks [Patient] : the patient [Benefits] : benefits [Alternatives] : alternatives [___ Month(s)] : in [unfilled] month(s) [FreeTextEntry1] : Again discussed with patient and wife.  EP referral.  Weight loss diet discussed.  Use of additional Cardizem as needed prolonged episodes of A. fib with rapid rates.  Pulmonary follow-up regarding sleep apnea.  Questions addressed with patient and wife.

## 2021-07-25 ENCOUNTER — NON-APPOINTMENT (OUTPATIENT)
Age: 61
End: 2021-07-25

## 2021-10-20 ENCOUNTER — NON-APPOINTMENT (OUTPATIENT)
Age: 61
End: 2021-10-20

## 2021-10-20 ENCOUNTER — APPOINTMENT (OUTPATIENT)
Dept: ORTHOPEDIC SURGERY | Facility: CLINIC | Age: 61
End: 2021-10-20
Payer: COMMERCIAL

## 2021-10-20 VITALS
WEIGHT: 240 LBS | SYSTOLIC BLOOD PRESSURE: 135 MMHG | DIASTOLIC BLOOD PRESSURE: 75 MMHG | HEIGHT: 72 IN | HEART RATE: 64 BPM | BODY MASS INDEX: 32.51 KG/M2

## 2021-10-20 PROCEDURE — 99214 OFFICE O/P EST MOD 30 MIN: CPT | Mod: 25

## 2021-10-20 PROCEDURE — 20550 NJX 1 TENDON SHEATH/LIGAMENT: CPT | Mod: F3

## 2021-10-20 RX ORDER — BETAMETHA AC,SOD PHOS/WATER/PF 6 MG/ML
6 (3-3) VIAL (ML) INJECTION
Qty: 1 | Refills: 0 | Status: COMPLETED | OUTPATIENT
Start: 2021-10-20

## 2021-10-20 RX ORDER — LIDOCAINE HYDROCHLORIDE 10 MG/ML
1 INJECTION, SOLUTION INFILTRATION; PERINEURAL
Refills: 0 | Status: COMPLETED | OUTPATIENT
Start: 2021-10-20

## 2021-10-20 RX ADMIN — BETAMETHASONE SODIUM PHOSPHATE AND BETAMETHASONE ACETATE 1 MG/ML: 3; 3 INJECTION, SUSPENSION INTRA-ARTICULAR; INTRALESIONAL; INTRAMUSCULAR; SOFT TISSUE at 00:00

## 2021-10-20 RX ADMIN — LIDOCAINE HYDROCHLORIDE 0.5 %: 10 INJECTION, SOLUTION INFILTRATION; PERINEURAL at 00:00

## 2021-10-20 NOTE — ADDENDUM
[FreeTextEntry1] : I, Sherry Sánchez, acted solely as a scribe for Dr. Clement on this date on 10/20/2021.

## 2021-10-20 NOTE — PROCEDURE
[FreeTextEntry1] : -  After a discussion of risks and benefits, the patient agreed to proceed with a cortisone injection.  \par -  Side: Left\par -  Finger 1: Middle trigger finger\par -  Finger 2: Ring trigger finger\par -  Medications: 0.5 cc of 1% Lidocaine and 1 cc of betamethasone, 6mg/cc, using sterile technique.\par -  Patient tolerated the procedure well, without complications.\par -  Patient was told that the symptoms may worsen for a day or two, and should then begin to improve. \par -  Instructions: Patient was instructed on activity modification for the next several days.\par -  Follow-up: Within 4 weeks to assess response to the injection.

## 2021-10-20 NOTE — END OF VISIT
[FreeTextEntry3] : This note was written by Sherry Sánchez on 10/20/2021 acting solely as a scribe for Dr. Rebel Clement.\par  \par All medical record entries made by the Scribe were at my, Dr. Rebel Clement, direction and personally dictated by me on 10/20/2021. I have personally reviewed the chart and agree that the record accurately reflects my personal performance of the history, physical exam, assessment and plan.

## 2021-10-20 NOTE — HISTORY OF PRESENT ILLNESS
[FreeTextEntry1] : He comes in today for evaluation of the left ring and middle fingers. He notes he is unable to flex and extend the digits. He wore a brace without relief. The left ring finger was locking, so he splinted it and after five days of splinting he was unable to kimberly the digit and his pain increased.\par \par I treated hand almost 3 years ago for a right trigger thumb and left index finger trigger finger with 2 cortisone injections.  He is doing well in this regard.\par \par He is a pre-diabetic but its not treated for Diabetes.\par \par He is not COVID vaccinated and is unsure of whether he will be getting it in the next two weeks.

## 2021-10-20 NOTE — PHYSICAL EXAM
[de-identified] : - Constitutional: This is a male in no obvious distress.  \par - Psych: Patient is alert and oriented to person, place and time.  Patient has a normal mood and affect.\par - Cardiovascular: Normal pulses throughout the upper extremities.  No significant varicosities are noted in the upper extremities. \par - Neuro: Strength and sensation are intact throughout the upper extremities.  Patient has normal coordination.\par - Respiratory:  Patient exhibits no evidence of shortness of breath or difficulty breathing.\par - Skin: No rashes, lesions, or other abnormalities are noted in the upper extremities.\par \par ---\par \par Examination of his left ring finger demonstrate tenderness without swelling along the A1 pulley.  There is no obvious triggering.  He has limitation of flexion.  He has full extension.  There is swelling and less notable tenderness along the A1 pulley of the left middle finger.  There is triggering.  He has full flexion extension of the middle finger.  There is no triggering of the other digits.  He is neurovascularly intact distally.

## 2021-10-20 NOTE — DISCUSSION/SUMMARY
[FreeTextEntry1] : He has findings consistent with a left ring and middle finger trigger finger.\par \par I had a discussion regarding today's visit, the prognosis of this diagnosis and treatment recommendations and options.  At this time, he has agreed to proceed with cortisone injections into the left middle and ring fingers. \par \par The patient has agreed to this plan of management and has expressed full understanding.  All questions were fully answered to the patient's satisfaction.\par \par My cumulative time spent on this patient's visit included: Preparation for the visit, review of the medical records, review of pertinent diagnostic studies, examination and counseling of the patient on the above diagnosis, treatment plan and prognosis, orders of diagnostic tests, medications and/or appropriate procedures and documentation in the medical records of today's visit.

## 2021-10-31 ENCOUNTER — RESULT CHARGE (OUTPATIENT)
Age: 61
End: 2021-10-31

## 2021-11-01 ENCOUNTER — NON-APPOINTMENT (OUTPATIENT)
Age: 61
End: 2021-11-01

## 2021-11-01 ENCOUNTER — APPOINTMENT (OUTPATIENT)
Dept: ELECTROPHYSIOLOGY | Facility: CLINIC | Age: 61
End: 2021-11-01
Payer: COMMERCIAL

## 2021-11-01 VITALS
SYSTOLIC BLOOD PRESSURE: 146 MMHG | TEMPERATURE: 97.3 F | RESPIRATION RATE: 17 BRPM | WEIGHT: 238 LBS | DIASTOLIC BLOOD PRESSURE: 78 MMHG | HEART RATE: 72 BPM | OXYGEN SATURATION: 95 % | BODY MASS INDEX: 32.23 KG/M2 | HEIGHT: 72 IN

## 2021-11-01 DIAGNOSIS — Z78.9 OTHER SPECIFIED HEALTH STATUS: ICD-10-CM

## 2021-11-01 DIAGNOSIS — Z82.49 FAMILY HISTORY OF ISCHEMIC HEART DISEASE AND OTHER DISEASES OF THE CIRCULATORY SYSTEM: ICD-10-CM

## 2021-11-01 DIAGNOSIS — R42 DIZZINESS AND GIDDINESS: ICD-10-CM

## 2021-11-01 PROCEDURE — 99204 OFFICE O/P NEW MOD 45 MIN: CPT

## 2021-11-01 PROCEDURE — 93000 ELECTROCARDIOGRAM COMPLETE: CPT

## 2021-11-01 RX ORDER — ATORVASTATIN CALCIUM 40 MG/1
40 TABLET, FILM COATED ORAL
Qty: 30 | Refills: 0 | Status: ACTIVE | COMMUNITY

## 2021-11-02 ENCOUNTER — APPOINTMENT (OUTPATIENT)
Dept: CARDIOLOGY | Facility: CLINIC | Age: 61
End: 2021-11-02
Payer: COMMERCIAL

## 2021-11-02 VITALS
SYSTOLIC BLOOD PRESSURE: 126 MMHG | OXYGEN SATURATION: 94 % | TEMPERATURE: 98 F | BODY MASS INDEX: 31.54 KG/M2 | WEIGHT: 238 LBS | HEIGHT: 73 IN | HEART RATE: 61 BPM | DIASTOLIC BLOOD PRESSURE: 79 MMHG | RESPIRATION RATE: 17 BRPM

## 2021-11-02 PROCEDURE — 99215 OFFICE O/P EST HI 40 MIN: CPT

## 2021-11-02 NOTE — CARDIOLOGY SUMMARY
[de-identified] : 11/01/21: Sinus  Rhythm \par -RSR(V1) -nondiagnostic. \par  -Left atrial enlargement. \par

## 2021-11-02 NOTE — REVIEW OF SYSTEMS
[Fever] : no fever [Weight Gain (___ Lbs)] : no recent weight gain [Chills] : no chills [Palpitations] : palpitations [Under Stress] : under stress [Negative] : Respiratory

## 2021-11-02 NOTE — DISCUSSION/SUMMARY
[FreeTextEntry1] : The patient is a 60-year-old man who has had paroxysmal atrial fibrillation over the last 3 years.  It appears that his episodes are increasing in frequency as well as duration.  The patient is aware of the episodes and mostly feels the fluttering and a sense of not feeling well during episodes.\par \par His risk factors include obesity, obstructive sleep apnea and hypertension.  His blood pressure seems to be fairly well controlled on diltiazem.  He is not yet treated for sleep apnea.\par \par Echocardiogram from 2019 showed preserved left ventricular function, normal right and left atrial size and normal valvular function. \par  [id number] score 1.  He is currently on Eliquis and has had no bleeding issues.\par \par Patient is currently on diltiazem as well as Multaq.  He has not shown significant bradycardia.  He is not taking Multaq reliably.  \par \par His risk factors include stress/anxiety, increased BMI, alcohol use, obstructive sleep apnea without treatment, history of hypertension.  \par \par \par Options for treatment discussed with the patient: Catheter ablation versus antiarrhythmic medication.  The ablation procedure including the risk, successfully, recurrence rate, complication rates were all discussed.  The patient is not interested in proceeding ablation at this point but would consider it in the future.  He is very afraid of having the ablation done at this time. He would prefer to remain on antiarrhythmic.  We also discussed the benefits of aggressive risk factor modification including weight loss and treatment of the sleep apnea.  Patient will pursue treatment both of these risk factors.  Also discussed modification of his stress and anxiety which he will also proceed.\par \par We will consider other antiarrhythmics such as low-dose flecainide if the Multaq is not effective.\par Prior to initiation of flecainide we should rule out coronary disease and would recommend a coronary CT.  .\par I have ordered a coronary CT scan.  We will have a follow-up discussion regarding other antiarrhythmic after he is initiated treatment for sleep apnea and the results of the CT scan are available.\par

## 2021-11-02 NOTE — CARDIOLOGY SUMMARY
[de-identified] : July 20, 2021 sinus rhythm [de-identified] : Stress test December 2019 no ischemia [de-identified] : Echo 2019 normal LV function

## 2021-11-02 NOTE — HISTORY OF PRESENT ILLNESS
[FreeTextEntry1] : Follow-up visit accompanied by his wife.  Communicated about the case with Dr. Taran Fritz.  His note is also reviewed.  Patient with paroxysmal A. fib continues to have episodes about once weekly some can last up to several hours.  He is being considered for flecainide now currently taking Multaq but irregularly also diltiazem and Eliquis.\par \par No chest pain shortness of breath.  Has not followed up on sleep study.  Has stress related to son's medical condition

## 2021-11-02 NOTE — HISTORY OF PRESENT ILLNESS
[FreeTextEntry1] : \par \par Patient is a 60-year-old man who was seen in evaluation regarding his paroxysmal  atrial fibrillation.  He was accompanied by spouse.\par He has a prior history of hypertension.   There is no prior history of diabetes, stroke, TIA, thyroid disease or any known CAD.  His  [id number] score 1.  He has been on Eliquis. Patient has had significant amount of stress and anxiety.  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  His son was recently diagnosed with a mental illness and he is stressed over the situation.  \par His symptoms started around 2018-19: racing heart beat/palpitations. AF with RVR documented in 2019 - [facility] ER.  Episodes now last  as much as 14 hours.  He has had recordings by his apple watch.  Some of the episodes were associated with increase in alcohol consumption.  He has occasional dizziness and mild shortness of breath no chest pain, syncope, presyncope.\par \par Patient seems to get a lot of episodes in the afternoon and at night include some episodes awaken from sleeping.    He now gets these episodes during the day and they occur in random fashions.  Occasionally he takes the diltiazem and seems to make it better.\par He has been Multaq but has not been consistently taking it and has forgotten to take his dosages at times.  He might of had some improvement on the Multaq.\par \par He had uncomplicated Covid infection last year.\par Test reviewed:\par \par EKGs  from March 2020  to  November 1, 2021 showed sinus rhythm.\par \par Treadmill stress test performed on 12/30/2019 [facility] protocol exercise duration 9 minutes occasional VPCs no ischemic changes. No AF\par \par Echocardiogram performed 3/5/2019: LVEF 60 to 65%, normal left atrial size and volume.  Normal valvular function.  No pericardial effusion.\par \par Event monitor from 7/16/2019 reviewed sinus rhythm\par \par Sleep study performed on 9/8/2020. AHI 20. Mostly obstructive.  EKG showed brief periods of atrial fibrillation during the sleep study. He has not had CPAP treatment.\par \par He is not aware of a family history of A. fib.  He denies excessive caffeine intake.  Patient has moderate alcohol intake.

## 2021-11-02 NOTE — REVIEW OF SYSTEMS
[Weight Gain (___ Lbs)] : [unfilled] ~Ulb weight gain [Palpitations] : palpitations [Snoring] : snoring [Heartburn] : heartburn [Dizziness] : dizziness [Anxiety] : anxiety [Under Stress] : under stress [Fever] : no fever [Feeling Fatigued] : not feeling fatigued [Blurry Vision] : no blurred vision [Sore Throat] : no sore throat [SOB] : no shortness of breath [Dyspnea on exertion] : not dyspnea during exertion [Chest Discomfort] : no chest discomfort [Lower Ext Edema] : no extremity edema [Orthopnea] : no orthopnea [PND] : no PND [Syncope] : no syncope [Cough] : no cough [Wheezing] : no wheezing [Coughing Up Blood] : no hemoptysis [Abdominal Pain] : no abdominal pain [Hematuria] : no hematuria [Easy Bleeding] : no tendency for easy bleeding

## 2021-11-02 NOTE — PHYSICAL EXAM
[No Acute Distress] : no acute distress [Obese] : obese [Normal Conjunctiva] : normal conjunctiva [Normal Venous Pressure] : normal venous pressure [Normal S1, S2] : normal S1, S2 [No Murmur] : no murmur [No Rub] : no rub [No Gallop] : no gallop [Clear Lung Fields] : clear lung fields [Non Tender] : non-tender [Normal Gait] : normal gait [No Edema] : no edema [No Cyanosis] : no cyanosis [No Clubbing] : no clubbing [No Varicosities] : no varicosities [No Rash] : no rash [No Focal Deficits] : no focal deficits [Alert and Oriented] : alert and oriented

## 2021-11-02 NOTE — REASON FOR VISIT
[Arrhythmia/ECG Abnorrmalities] : arrhythmia/ECG abnormalities [Spouse] : spouse [FreeTextEntry3] : Dr. Ross Sandoval

## 2021-11-02 NOTE — DISCUSSION/SUMMARY
[Patient] : the patient [Risks] : risks [Benefits] : benefits [Alternatives] : alternatives [FreeTextEntry1] : Discussed again with patient and wife.  Reviewed EP consultation note.  Questions addressed with patient and wife.  We referred to Dr. Ma regarding his sleep apnea.  Discussed weight loss diet exercise.  He will have CTA to exclude significant CAD prior to consideration of flecainide.  Discussed risks and benefits in detail.  Continue Eliquis.  Continue Cardizem long-acting as well as as needed Cardizem.  Possible ablation in future discussed but he does not wish at this time.  Follow-up with me after above testing.

## 2021-11-15 ENCOUNTER — APPOINTMENT (OUTPATIENT)
Dept: PULMONOLOGY | Facility: CLINIC | Age: 61
End: 2021-11-15
Payer: COMMERCIAL

## 2021-11-15 PROCEDURE — 99215 OFFICE O/P EST HI 40 MIN: CPT | Mod: 95

## 2021-11-15 NOTE — PHYSICAL EXAM
[Normal Appearance] : normal appearance [General Appearance - In No Acute Distress] : no acute distress [] : no respiratory distress [Oriented To Time, Place, And Person] : oriented to person, place, and time [Exaggerated Use Of Accessory Muscles For Inspiration] : no accessory muscle use [Affect] : the affect was normal [Mood] : the mood was normal

## 2021-11-15 NOTE — REASON FOR VISIT
[Follow-Up Visit] : a follow-up visit for [Home] : at home, [unfilled] , at the time of the visit. [Medical Office: ([facility])___] : at the medical office located in  [Verbal consent obtained from patient] : the patient, [unfilled]

## 2021-11-16 NOTE — HISTORY OF PRESENT ILLNESS
[Snoring] : snoring [Frequent Nocturnal Awakening] : frequent nocturnal awakening [Awakes Unrefreshed] : awakening unrefreshed [Awakening With Dry Mouth] : awakening with dry mouth [Recent  Weight Gain] : recent weight gain [DMS] : DMS [To Bed: ___] : ~he/she~ goes to bed at [unfilled] [Arises: ___] : arises at [unfilled] [Sleep Onset Latency: ___ minutes] : sleep onset latency of [unfilled] minutes reported [Nocturnal Awakenings: ___] : ~he/she~ typically has [unfilled] nocturnal awakenings [WASO: ___] : Wake time after sleep onset is [unfilled] [TST: ___] : Total sleep time is [unfilled] [Daytime Sleep: ___] : daytime sleep: [unfilled] [FreeTextEntry1] : 61 y/o M, diagnosed with moderate LAUREN, severe in REM sleep --AHI 20/hr, REM AHI of 65.1/hr, with a T 90 of 2 %, comprised mostly of hypopneas, and obstructive apneas, with ECG showing normal sinus rhythm with periods of Afib, via PSG (9/8/2020), with comorbid A-fib, HTN, HLD, Anxiety, presents to obtain the CPAP Titration study ordered during his last visit in 10/2020. The patient presents today with reports of increased episodes of A-fib, occurring sporadically throughout the day, and wishes to undergo the CPAP titration study now. He denies nocturnal arousals with it. He has been stressed recently from family related matters. He is followed by cardiology. Since his last visit, the patient was hospitalized with COVID-19 in 4/2021, received Remdesivir, MAB. Feels back to baseline. He reports 18-20 lb weight gain since his last visit. \par \par The patient currently endorses occasional snoring, non-restorative sleep, and AM dry mouth. He continues to have nocturia. He denies headaches, nocturnal awakenings from gasping / choking sensations, somnolence, and drowsy driving.\par  Usually delayed sleeper --goes to bed around 1:30 AM, wakes up 5-hours later, remains awake for 1-hour browsing the internet, returns to sleep for another 45-60 minutes, arises by 7/7:30 am. Naps daily around 5 pm for 30-minutes, which is refreshing. \par Additional sleep related symptoms and sleep schedule is as indicated below. \par \par Of note, patient was told in the past that he may have a deviated nasal septum. Reports difficulty breathing through one of his nostrils for most of his life. He has not been evaluated by ENT in a long  time.  [Witnessed Apneas] : no witnessed sleep apnea [Unintentional Sleep while Active] : no unintentional sleep while active [Unintentional Sleep While Inactive] : no unintentional sleep while inactive [Awakes with Headache] : no headache upon awakening [Daytime Somnolence] : no daytime somnolence [DIS] : no DIS [Unusual Sleep Behavior] : no unusual sleep behavior [Hypersomnolence] : no hypersomnolence [Cataplexy] : no cataplexy [Sleep Paralysis] : no sleep paralysis [Hypnagogic Hallucinations] : no hallucinations when falling asleep [Hypnopompic Hallucinations] : no hallucinations when awakening [Lower Extremity Discomfort] : no lower extremity discomfort in evening or at bedtime [ESS] : 1

## 2021-11-16 NOTE — REVIEW OF SYSTEMS
[Right] : right [Fatigue] : fatigue [Recent Wt Gain (___ Lbs)] : recent [unfilled] ~Ulb weight gain [Nasal Congestion] : nasal congestion [Snoring] : snoring [A.M. Dry Mouth] : a.m. dry mouth [Obesity] : obesity [Nocturia] : nocturia [Anxious] : anxious [Negative] : Musculoskeletal [Witnessed Apneas] : no witnessed apnea [Chest Pain] : no chest pain [CHF] : no congestive heart failure [Thyroid Disease] : no thyroid disease [Diabetes] : no diabetes  [History of Iron Deficiency] : no history of iron deficiency [A.M. Headache] : no headache present upon awakening [Heartburn] : no heartburn [Depression] : no depression [FreeTextEntry9] : p-afib

## 2021-11-16 NOTE — ASSESSMENT
[FreeTextEntry1] : 61 y/o M, diagnosed with moderate LAUREN, severe in REM sleep --AHI 20/hr, REM AHI of 65.1/hr, with a T 90 of 2 %, comprised mostly of hypopneas, and obstructive apneas, with ECG showing normal sinus rhythm with periods of Afib, via PSG (9/8/2020), with comorbid A-fib, HTN, HLD, Anxiety, presents to obtain the CPAP Titration study ordered during his last visit in 10/2020. The patient presents today with reports of increased episodes of A-fib, occurring sporadically throughout the day, and wishes to undergo the CPAP titration study now. He denies nocturnal arousals from it. He has been under stress recently from family related matters. He is followed by cardiology. Since his last visit, the patient was hospitalized with COVID-19 in 4/2021, received Remdesivir, MAB. Feels back to baseline. He reports 18-20 lb weight gain since his last visit. The patient currently endorses occasional snoring, non-restorative sleep, and AM dry mouth. \par \par ----CPAP Titration study ordered with [facility] at [facility] as patient is familiar with this location. In-lab study is optimal  to assess for possible emergence of central apneas given his afib. \par ----Patient is aware that insurance may require a diagnostic sleep study within the year and is amenable to have a home sleep study if needed. \par ----The ramifications of untreated apneas and its association particularly to Afib was discussed at length with the patient. \par ----Weight loss encouraged. The role of obesity and weight loss as it relates to LAUREN was explained to the patient.\par \par ----Patient declined ENT referral for reported possible deviated nasal septum and lifelong difficulty breathing through one of his nostrils, despite verbalizing understanding that this may hinder tolerance to pap therapy. \par \par F/U after CPAP Titration study.

## 2021-11-17 ENCOUNTER — APPOINTMENT (OUTPATIENT)
Dept: ORTHOPEDIC SURGERY | Facility: CLINIC | Age: 61
End: 2021-11-17
Payer: COMMERCIAL

## 2021-11-17 VITALS — BODY MASS INDEX: 31.54 KG/M2 | WEIGHT: 238 LBS | HEIGHT: 73 IN

## 2021-11-17 PROCEDURE — 99213 OFFICE O/P EST LOW 20 MIN: CPT

## 2021-11-17 NOTE — ADDENDUM
[FreeTextEntry1] : I, Sherry Sánchez, acted solely as a scribe for Dr. Clement on this date on 11/17/2021.

## 2021-11-17 NOTE — DISCUSSION/SUMMARY
[FreeTextEntry1] : I had a discussion regarding today's visit, the diagnosis and treatment recommendations and options.  We also discussed changes since the last visit.  At this time, I told him that even if he had not had the COVID vaccination as of 3 days ago, I would recommend observation of his symptoms are they have begun to resolve. He will follow up in 4 weeks for a possible repeat cortisone injection if his symptoms persist.\par \par The patient has agreed to the above plan of management and has expressed full understanding.  All questions were fully answered to the patient's satisfaction.\par \par My cumulative time spent on today's visit was greater than 30 minutes and included: Preparation for the visit, review of the medical records, review of pertinent diagnostic studies, examination and counseling of the patient on the above diagnosis, treatment plan and prognosis, orders of diagnostic tests, medications and/or appropriate procedures and documentation in the medical records of today's visit.

## 2021-11-17 NOTE — HISTORY OF PRESENT ILLNESS
[FreeTextEntry1] : 4 weeks status post left middle and ring finger trigger cortisone injections #1.\par \par He notes stiffness to the hands in the morning but he does note improvements of about 80 percent since the initial cortisone injections.\par \par I treated hand almost 3 years ago for a right trigger thumb and left index finger trigger finger with 2 cortisone injections.  He is doing well in this regard.\par \par He is a pre-diabetic but its not treated for Diabetes.\par \par He was just vaccinated for COVID with [employer] as of Sunday 11/14/21.

## 2021-11-17 NOTE — END OF VISIT
[FreeTextEntry3] : This note was written by Sherry Sánchez on 11/17/2021 acting solely as a scribe for Dr. Rebel Clmeent.\par  \par All medical record entries made by the Scribe were at my, Dr. Rebel Clement, direction and personally dictated by me on 11/17/2021. I have personally reviewed the chart and agree that the record accurately reflects my personal performance of the history, physical exam, assessment and plan.

## 2021-11-23 ENCOUNTER — RX RENEWAL (OUTPATIENT)
Age: 61
End: 2021-11-23

## 2021-11-29 ENCOUNTER — APPOINTMENT (OUTPATIENT)
Dept: CT IMAGING | Facility: CLINIC | Age: 61
End: 2021-11-29
Payer: COMMERCIAL

## 2021-11-29 ENCOUNTER — OUTPATIENT (OUTPATIENT)
Dept: OUTPATIENT SERVICES | Facility: HOSPITAL | Age: 61
LOS: 1 days | End: 2021-11-29
Payer: COMMERCIAL

## 2021-11-29 DIAGNOSIS — I48.0 PAROXYSMAL ATRIAL FIBRILLATION: ICD-10-CM

## 2021-11-29 PROCEDURE — 82565 ASSAY OF CREATININE: CPT

## 2021-11-29 PROCEDURE — 75574 CT ANGIO HRT W/3D IMAGE: CPT

## 2021-11-29 PROCEDURE — 75574 CT ANGIO HRT W/3D IMAGE: CPT | Mod: 26

## 2021-12-14 ENCOUNTER — RX RENEWAL (OUTPATIENT)
Age: 61
End: 2021-12-14

## 2022-02-01 ENCOUNTER — APPOINTMENT (OUTPATIENT)
Dept: CARDIOLOGY | Facility: CLINIC | Age: 62
End: 2022-02-01
Payer: COMMERCIAL

## 2022-02-01 VITALS — TEMPERATURE: 98.7 F

## 2022-02-01 VITALS
HEIGHT: 73 IN | BODY MASS INDEX: 31.28 KG/M2 | WEIGHT: 236 LBS | DIASTOLIC BLOOD PRESSURE: 84 MMHG | SYSTOLIC BLOOD PRESSURE: 152 MMHG | HEART RATE: 72 BPM | OXYGEN SATURATION: 98 %

## 2022-02-01 VITALS — DIASTOLIC BLOOD PRESSURE: 80 MMHG | SYSTOLIC BLOOD PRESSURE: 138 MMHG

## 2022-02-01 DIAGNOSIS — R91.1 SOLITARY PULMONARY NODULE: ICD-10-CM

## 2022-02-01 PROCEDURE — 93000 ELECTROCARDIOGRAM COMPLETE: CPT

## 2022-02-01 PROCEDURE — 99215 OFFICE O/P EST HI 40 MIN: CPT

## 2022-02-01 NOTE — DISCUSSION/SUMMARY
[Patient] : the patient [Risks] : risks [Benefits] : benefits [Alternatives] : alternatives [___ Month(s)] : in [unfilled] month(s) [FreeTextEntry1] : Extended discussion with patient and wife.  He is to have pulmonary follow-up regarding his sleep apnea discussed Mediterranean diet exercise weight loss findings of fat related to his liver as may relate to his weight and prior alcohol use.  Pulmonary nodule follow-up by CT scanning.  Presence of nonobstructive CAD.  Lipids being obtained today may benefit from increasing lipid-lowering therapy.  Continue oral anticoagulation with Eliquis.  Follow-up with EP Dr. Tena.\par Call me for lab results later this week.  Follow-up with PMD.

## 2022-02-01 NOTE — REVIEW OF SYSTEMS
[Palpitations] : palpitations [Under Stress] : under stress [Negative] : Respiratory [Fever] : no fever [Weight Gain (___ Lbs)] : no recent weight gain [Chills] : no chills

## 2022-02-01 NOTE — ASSESSMENT
[FreeTextEntry1] : Paroxysmal atrial fibrillation on Multaq as antiarrhythmic therapy and Eliquis as anticoagulant.  Hypertension   Overweight.  Sleep apnea.  Nonobstructive CAD by CTA

## 2022-02-01 NOTE — CARDIOLOGY SUMMARY
[de-identified] : July 20, 2021 sinus rhythm [de-identified] : Stress test December 2019 no ischemia [de-identified] : Echo 2019 normal LV function [de-identified] : November 29, 2021   nonobstructive coronary disease 5 mm right middle lobe pulmonary nodule

## 2022-02-01 NOTE — HISTORY OF PRESENT ILLNESS
[FreeTextEntry1] : Follow-up visit accompanied by his wife.  \par \par \par \mark Has occasional episodes of racing heart with documented atrial fibrillation on his apple watch.  He had one episode while traveling on vacation in an airplane and rates were high he took diltiazem and rate come down to about 100.  Last episode was yesterday.\par \par He was undergone CTA which showed nonobstructive CAD.  He is scheduled for CPAP titration this weekend.\par \par He denied chest pain or dyspnea is not having any significant alcohol.

## 2022-02-01 NOTE — REASON FOR VISIT
Please advise  Trey Guerrero MA [Arrhythmia/ECG Abnorrmalities] : arrhythmia/ECG abnormalities [Other: ____] : [unfilled] [Spouse] : spouse [FreeTextEntry3] : Scarascia

## 2022-02-02 LAB
ALBUMIN SERPL ELPH-MCNC: 4.8 G/DL
ALP BLD-CCNC: 74 U/L
ALT SERPL-CCNC: 27 U/L
ANION GAP SERPL CALC-SCNC: 14 MMOL/L
AST SERPL-CCNC: 19 U/L
BASOPHILS # BLD AUTO: 0.07 K/UL
BASOPHILS NFR BLD AUTO: 0.8 %
BILIRUB SERPL-MCNC: 0.3 MG/DL
BUN SERPL-MCNC: 18 MG/DL
CALCIUM SERPL-MCNC: 10.1 MG/DL
CHLORIDE SERPL-SCNC: 102 MMOL/L
CHOLEST SERPL-MCNC: 197 MG/DL
CO2 SERPL-SCNC: 22 MMOL/L
CREAT SERPL-MCNC: 1.05 MG/DL
EOSINOPHIL # BLD AUTO: 0.24 K/UL
EOSINOPHIL NFR BLD AUTO: 2.8 %
GLUCOSE SERPL-MCNC: 140 MG/DL
HCT VFR BLD CALC: 48.5 %
HDLC SERPL-MCNC: 33 MG/DL
HGB BLD-MCNC: 16.4 G/DL
IMM GRANULOCYTES NFR BLD AUTO: 0.3 %
LDLC SERPL CALC-MCNC: 111 MG/DL
LYMPHOCYTES # BLD AUTO: 2.76 K/UL
LYMPHOCYTES NFR BLD AUTO: 31.9 %
MAN DIFF?: NORMAL
MCHC RBC-ENTMCNC: 30.6 PG
MCHC RBC-ENTMCNC: 33.8 GM/DL
MCV RBC AUTO: 90.5 FL
MONOCYTES # BLD AUTO: 0.8 K/UL
MONOCYTES NFR BLD AUTO: 9.2 %
NEUTROPHILS # BLD AUTO: 4.76 K/UL
NEUTROPHILS NFR BLD AUTO: 55 %
NONHDLC SERPL-MCNC: 164 MG/DL
PLATELET # BLD AUTO: 287 K/UL
POTASSIUM SERPL-SCNC: 4.2 MMOL/L
PROT SERPL-MCNC: 7.1 G/DL
RBC # BLD: 5.36 M/UL
RBC # FLD: 12.9 %
SODIUM SERPL-SCNC: 138 MMOL/L
TRIGL SERPL-MCNC: 265 MG/DL
TSH SERPL-ACNC: 1.39 UIU/ML
WBC # FLD AUTO: 8.66 K/UL

## 2022-02-11 ENCOUNTER — RX RENEWAL (OUTPATIENT)
Age: 62
End: 2022-02-11

## 2022-02-15 ENCOUNTER — NON-APPOINTMENT (OUTPATIENT)
Age: 62
End: 2022-02-15

## 2022-02-17 ENCOUNTER — NON-APPOINTMENT (OUTPATIENT)
Age: 62
End: 2022-02-17

## 2022-02-25 ENCOUNTER — NON-APPOINTMENT (OUTPATIENT)
Age: 62
End: 2022-02-25

## 2022-03-07 ENCOUNTER — RX RENEWAL (OUTPATIENT)
Age: 62
End: 2022-03-07

## 2022-04-04 ENCOUNTER — RX RENEWAL (OUTPATIENT)
Age: 62
End: 2022-04-04

## 2022-04-06 ENCOUNTER — APPOINTMENT (OUTPATIENT)
Dept: ORTHOPEDIC SURGERY | Facility: CLINIC | Age: 62
End: 2022-04-06
Payer: COMMERCIAL

## 2022-04-06 PROCEDURE — 20550 NJX 1 TENDON SHEATH/LIGAMENT: CPT | Mod: F3,LT

## 2022-04-06 PROCEDURE — 99214 OFFICE O/P EST MOD 30 MIN: CPT | Mod: 25

## 2022-04-06 RX ORDER — BETAMETHA AC,SOD PHOS/WATER/PF 6 MG/ML
6 (3-3) VIAL (ML) INJECTION
Qty: 1 | Refills: 0 | Status: COMPLETED | OUTPATIENT
Start: 2022-04-06

## 2022-04-06 RX ORDER — LIDOCAINE HYDROCHLORIDE 10 MG/ML
1 INJECTION, SOLUTION INFILTRATION; PERINEURAL
Refills: 0 | Status: COMPLETED | OUTPATIENT
Start: 2022-04-06

## 2022-04-06 RX ADMIN — LIDOCAINE HYDROCHLORIDE 0.5 %: 10 INJECTION, SOLUTION INFILTRATION; PERINEURAL at 00:00

## 2022-04-06 RX ADMIN — BETAMETHASONE SODIUM PHOSPHATE AND BETAMETHASONE ACETATE 1 MG/ML: 3; 3 INJECTION, SUSPENSION INTRA-ARTICULAR; INTRALESIONAL; INTRAMUSCULAR; SOFT TISSUE at 00:00

## 2022-04-06 NOTE — DISCUSSION/SUMMARY
[FreeTextEntry1] : I had a discussion regarding today's visit, the diagnosis and treatment recommendations and options.  We also discussed changes since the last visit.  At this time, he agreed to proceed with a repeat cortisone injection to the flexor tendon sheath of the left ring finger. He understands that this may not provide him with long term relief and if it does not, I would recommend he return for further treatment recommendations. \par \par The patient has agreed to the above plan of management and has expressed full understanding.  All questions were fully answered to the patient's satisfaction.\par \par My cumulative time spent on today's visit was greater than 30 minutes and included: Preparation for the visit, review of the medical records, review of pertinent diagnostic studies, examination and counseling of the patient on the above diagnosis, treatment plan and prognosis, orders of diagnostic tests, medications and/or appropriate procedures and documentation in the medical records of today's visit.

## 2022-04-06 NOTE — END OF VISIT
[FreeTextEntry3] : This note was written by Sherry Sánchez on 04/06/2022 acting solely as a scribe for Dr. Rebel Clement.\par  \par All medical record entries made by the Scribe were at my, Dr. Rebel Clement, direction and personally dictated by me on 04/06/2022. I have personally reviewed the chart and agree that the record accurately reflects my personal performance of the history, physical exam, assessment and plan.

## 2022-04-06 NOTE — PROCEDURE
[FreeTextEntry1] : -  After a discussion of risks and benefits, the patient agreed to proceed with a cortisone injection.  \par -  Side: Left\par -  Finger: Ring finger trigger finger\par -  Medications: 0.5 cc of 1% Lidocaine and 1 cc of betamethasone, using sterile technique.\par -  Patient tolerated the procedure well, without complications.\par -  Patient was told that the symptoms may worsen for a day or two, and should then begin to improve. \par -  Instructions: Patient was instructed on activity modification for the next several days.\par -  Follow-up: According to his symptoms.

## 2022-04-06 NOTE — ADDENDUM
[FreeTextEntry1] : I, Sherry Sánchez, acted solely as a scribe for Dr. Clement on this date on 04/06/2022.

## 2022-04-06 NOTE — HISTORY OF PRESENT ILLNESS
[FreeTextEntry1] : 5-1/2 months status post left middle and ring finger trigger cortisone injections #1.\par \par He returns today, with increased pain to the left ring finger. He states in the morning, his hands feel right and swollen as if they were caught in a door. \par \par I treated hand almost 3 years ago for a right trigger thumb and left index finger trigger finger with 2 cortisone injections.  He is doing well in this regard.\par \par He is a pre-diabetic but its not treated for Diabetes.\par \par He is accompanied by his wife today.

## 2022-04-18 ENCOUNTER — RX RENEWAL (OUTPATIENT)
Age: 62
End: 2022-04-18

## 2022-05-03 ENCOUNTER — APPOINTMENT (OUTPATIENT)
Dept: CARDIOLOGY | Facility: CLINIC | Age: 62
End: 2022-05-03
Payer: COMMERCIAL

## 2022-05-03 ENCOUNTER — NON-APPOINTMENT (OUTPATIENT)
Age: 62
End: 2022-05-03

## 2022-05-03 VITALS
WEIGHT: 229 LBS | DIASTOLIC BLOOD PRESSURE: 70 MMHG | BODY MASS INDEX: 31.02 KG/M2 | SYSTOLIC BLOOD PRESSURE: 128 MMHG | OXYGEN SATURATION: 97 % | HEIGHT: 72 IN | HEART RATE: 73 BPM

## 2022-05-03 VITALS — TEMPERATURE: 97.7 F

## 2022-05-03 DIAGNOSIS — U07.1 COVID-19: ICD-10-CM

## 2022-05-03 DIAGNOSIS — E78.00 PURE HYPERCHOLESTEROLEMIA, UNSPECIFIED: ICD-10-CM

## 2022-05-03 PROCEDURE — 93000 ELECTROCARDIOGRAM COMPLETE: CPT

## 2022-05-03 PROCEDURE — 93246 EXT ECG>7D<15D RECORDING: CPT

## 2022-05-03 PROCEDURE — 99214 OFFICE O/P EST MOD 30 MIN: CPT

## 2022-05-03 RX ORDER — DILTIAZEM HYDROCHLORIDE 180 MG/1
180 CAPSULE, EXTENDED RELEASE ORAL
Qty: 30 | Refills: 3 | Status: DISCONTINUED | COMMUNITY
Start: 2020-11-24 | End: 2022-05-03

## 2022-05-03 NOTE — REASON FOR VISIT
[Arrhythmia/ECG Abnorrmalities] : arrhythmia/ECG abnormalities [Hypertension] : hypertension [Spouse] : spouse [FreeTextEntry3] : Scarascia

## 2022-05-03 NOTE — DISCUSSION/SUMMARY
[Patient] : the patient [Risks] : risks [Benefits] : benefits [Alternatives] : alternatives [___ Month(s)] : in [unfilled] month(s) [FreeTextEntry1] : Counseled regarding diet and carbohydrates weight loss in light of finding of fatty liver on his cardiac CT.  Since he feels poorly will obtain blood work-up as above, will also arrange event recorder or prolonged Holter regarding his palpitations and paroxysmal A. fib.  He wishes to retry higher dose of Cardizem will increase to 240 mg daily.  Continue Eliquis.  Multiple questions addressed with patient.  Call me for event recorder results and lab results follow-up 4 to 6 weeks

## 2022-05-03 NOTE — CARDIOLOGY SUMMARY
[de-identified] : The third, 2022 sinus rhythm [de-identified] : Stress test December 2019 no ischemia [de-identified] : Echo 2019 normal LV function [de-identified] : November 2021  nonobstructive CAD

## 2022-05-03 NOTE — HISTORY OF PRESENT ILLNESS
[FreeTextEntry1] : Seen for follow-up accompanied by his wife.  Has good days and days he does not feel so well such as today.  In a nonspecific way he just does not feel right.  Denied fever chills cough sore throat chest pain GI upset diarrhea urinary symptoms.  Occasional palpitation but not prolonged.  Admits to anxiety.  Was told of fatty liver.  Admits to eating a lot of carbohydrates [employer] cookies cakes

## 2022-05-04 ENCOUNTER — APPOINTMENT (OUTPATIENT)
Dept: SLEEP CENTER | Facility: CLINIC | Age: 62
End: 2022-05-04

## 2022-05-04 LAB
ALBUMIN SERPL ELPH-MCNC: 5.1 G/DL
ALP BLD-CCNC: 83 U/L
ALT SERPL-CCNC: 28 U/L
ANION GAP SERPL CALC-SCNC: 12 MMOL/L
AST SERPL-CCNC: 21 U/L
BASOPHILS # BLD AUTO: 0.08 K/UL
BASOPHILS NFR BLD AUTO: 0.9 %
BILIRUB SERPL-MCNC: 0.4 MG/DL
BUN SERPL-MCNC: 15 MG/DL
CALCIUM SERPL-MCNC: 10.7 MG/DL
CHLORIDE SERPL-SCNC: 101 MMOL/L
CHOLEST SERPL-MCNC: 191 MG/DL
CO2 SERPL-SCNC: 25 MMOL/L
CREAT SERPL-MCNC: 1.12 MG/DL
EGFR: 74 ML/MIN/1.73M2
EOSINOPHIL # BLD AUTO: 0.18 K/UL
EOSINOPHIL NFR BLD AUTO: 2.1 %
GLUCOSE SERPL-MCNC: 99 MG/DL
HCT VFR BLD CALC: 51.4 %
HDLC SERPL-MCNC: 38 MG/DL
HGB BLD-MCNC: 16.8 G/DL
IMM GRANULOCYTES NFR BLD AUTO: 0.2 %
LDLC SERPL CALC-MCNC: 113 MG/DL
LYMPHOCYTES # BLD AUTO: 3.05 K/UL
LYMPHOCYTES NFR BLD AUTO: 35.1 %
MAGNESIUM SERPL-MCNC: 2.4 MG/DL
MAN DIFF?: NORMAL
MCHC RBC-ENTMCNC: 29.8 PG
MCHC RBC-ENTMCNC: 32.7 GM/DL
MCV RBC AUTO: 91.1 FL
MONOCYTES # BLD AUTO: 0.77 K/UL
MONOCYTES NFR BLD AUTO: 8.9 %
NEUTROPHILS # BLD AUTO: 4.58 K/UL
NEUTROPHILS NFR BLD AUTO: 52.8 %
NONHDLC SERPL-MCNC: 153 MG/DL
PLATELET # BLD AUTO: 280 K/UL
POTASSIUM SERPL-SCNC: 4.5 MMOL/L
PROT SERPL-MCNC: 7.5 G/DL
RBC # BLD: 5.64 M/UL
RBC # FLD: 12.7 %
SODIUM SERPL-SCNC: 139 MMOL/L
TRIGL SERPL-MCNC: 201 MG/DL
TSH SERPL-ACNC: 1.91 UIU/ML
WBC # FLD AUTO: 8.68 K/UL

## 2022-05-17 ENCOUNTER — NON-APPOINTMENT (OUTPATIENT)
Age: 62
End: 2022-05-17

## 2022-05-19 PROCEDURE — 93248 EXT ECG>7D<15D REV&INTERPJ: CPT

## 2022-06-09 ENCOUNTER — RX RENEWAL (OUTPATIENT)
Age: 62
End: 2022-06-09

## 2022-06-14 ENCOUNTER — APPOINTMENT (OUTPATIENT)
Dept: CARDIOLOGY | Facility: CLINIC | Age: 62
End: 2022-06-14
Payer: COMMERCIAL

## 2022-06-14 VITALS
SYSTOLIC BLOOD PRESSURE: 131 MMHG | OXYGEN SATURATION: 98 % | HEART RATE: 71 BPM | BODY MASS INDEX: 31.02 KG/M2 | HEIGHT: 72 IN | WEIGHT: 229 LBS | DIASTOLIC BLOOD PRESSURE: 78 MMHG

## 2022-06-14 DIAGNOSIS — I25.10 ATHEROSCLEROTIC HEART DISEASE OF NATIVE CORONARY ARTERY W/OUT ANGINA PECTORIS: ICD-10-CM

## 2022-06-14 PROCEDURE — 99214 OFFICE O/P EST MOD 30 MIN: CPT

## 2022-06-14 NOTE — HISTORY OF PRESENT ILLNESS
[FreeTextEntry1] : Seen for follow-up accompanied by his wife. \par \par Today says he feels great.  Has had a few episodes of palpitations and atrial fibrillation.  This was confirmed on event monitoring.  Tolerating medication.  Indicates he will see a physician regarding fatty liver.  Also has plans to follow-up regarding his sleep apnea.  Lab data was reviewed with them.

## 2022-06-14 NOTE — DISCUSSION/SUMMARY
[Patient] : the patient [Risks] : risks [Benefits] : benefits [Alternatives] : alternatives [___ Month(s)] : in [unfilled] month(s) [FreeTextEntry1] : Continue Cardizem  240 mg daily.  Continue Eliquis.  Multiple questions addressed with patient.  Discussed appropriate diet and weight loss consideration to adding Zetia to her regimen to lower cholesterol further.  Continue Multaq.  We will contact his physician regarding sleep apnea management and his primary care physician regarding noncardiac issues as above see me again in 4 months

## 2022-06-14 NOTE — ASSESSMENT
[FreeTextEntry1] : Paroxysmal atrial fibrillation on Multaq as antiarrhythmic therapy and Eliquis as anticoagulant.  Hypertension reasonable control.  Overweight.  Sleep apnea.\par \par Abnormal labs noted and discussed including elevated hematocrit which may be related to his sleep apnea and mild elevation of calcium.

## 2022-06-14 NOTE — CARDIOLOGY SUMMARY
[de-identified] : 2022 sinus rhythm [de-identified] : Stress test December 2019 no ischemia [de-identified] : Echo 2019 normal LV function [de-identified] : November 2021  nonobstructive CAD

## 2022-07-07 ENCOUNTER — RX RENEWAL (OUTPATIENT)
Age: 62
End: 2022-07-07

## 2022-07-20 ENCOUNTER — APPOINTMENT (OUTPATIENT)
Dept: ELECTROPHYSIOLOGY | Facility: CLINIC | Age: 62
End: 2022-07-20

## 2022-08-05 ENCOUNTER — RX RENEWAL (OUTPATIENT)
Age: 62
End: 2022-08-05

## 2022-08-18 ENCOUNTER — APPOINTMENT (OUTPATIENT)
Dept: HEPATOLOGY | Facility: CLINIC | Age: 62
End: 2022-08-18

## 2022-08-18 PROCEDURE — 91200 LIVER ELASTOGRAPHY: CPT

## 2022-09-05 ENCOUNTER — NON-APPOINTMENT (OUTPATIENT)
Age: 62
End: 2022-09-05

## 2022-09-22 ENCOUNTER — RX RENEWAL (OUTPATIENT)
Age: 62
End: 2022-09-22

## 2022-11-01 ENCOUNTER — APPOINTMENT (OUTPATIENT)
Dept: CARDIOLOGY | Facility: CLINIC | Age: 62
End: 2022-11-01

## 2022-11-01 ENCOUNTER — NON-APPOINTMENT (OUTPATIENT)
Age: 62
End: 2022-11-01

## 2022-11-01 VITALS
OXYGEN SATURATION: 97 % | SYSTOLIC BLOOD PRESSURE: 140 MMHG | BODY MASS INDEX: 32.1 KG/M2 | WEIGHT: 237 LBS | HEIGHT: 72 IN | HEART RATE: 68 BPM | DIASTOLIC BLOOD PRESSURE: 80 MMHG

## 2022-11-01 VITALS — SYSTOLIC BLOOD PRESSURE: 118 MMHG | DIASTOLIC BLOOD PRESSURE: 72 MMHG

## 2022-11-01 DIAGNOSIS — I25.10 ATHEROSCLEROTIC HEART DISEASE OF NATIVE CORONARY ARTERY W/OUT ANGINA PECTORIS: ICD-10-CM

## 2022-11-01 PROCEDURE — 93000 ELECTROCARDIOGRAM COMPLETE: CPT

## 2022-11-01 PROCEDURE — 99214 OFFICE O/P EST MOD 30 MIN: CPT | Mod: 25

## 2022-11-01 NOTE — DISCUSSION/SUMMARY
[Patient] : the patient [Risks] : risks [Benefits] : benefits [Alternatives] : alternatives [___ Month(s)] : in [unfilled] month(s) [FreeTextEntry1] : Continue Cardizem  240 mg daily.  Continue Eliquis.  Multiple questions addressed with patient.  Discussed appropriate diet and weight loss as well as exercise.  Continue Multaq.\par \par Should have repeat blood testing either with PMD or here next visit attention to lipids and chemistries. [EKG obtained to assist in diagnosis and management of assessed problem(s)] : EKG obtained to assist in diagnosis and management of assessed problem(s)

## 2022-11-01 NOTE — HISTORY OF PRESENT ILLNESS
[FreeTextEntry1] : Seen for follow-up accompanied by his wife. \par \par Indicates it went 8 weeks without any fibrillation episodes then had brief 1 more recently lasting about 1 minute at rate of 150.  No chest pain shortness of breath.  Has not been exercising.  Has not yet performed sleep study.  Claims to take medication as directed.  Admits to dietary nonadherence.

## 2022-11-01 NOTE — ASSESSMENT
[FreeTextEntry1] : Paroxysmal atrial fibrillation on Multaq as antiarrhythmic therapy and Eliquis as anticoagulant.  Hypertension reasonable control.  Overweight with recent weight gain.  Sleep apnea.\par \par

## 2022-11-01 NOTE — CARDIOLOGY SUMMARY
[de-identified] : November 1, 2022 sinus rhythm [de-identified] : Stress test December 2019 no ischemia [de-identified] : Echo 2019 normal LV function [de-identified] : November 2021  nonobstructive CAD

## 2022-11-01 NOTE — REVIEW OF SYSTEMS
[Weight Gain (___ Lbs)] : [unfilled] ~Ulb weight gain [SOB] : no shortness of breath [Palpitations] : palpitations [Syncope] : no syncope [Cough] : cough

## 2022-11-11 ENCOUNTER — NON-APPOINTMENT (OUTPATIENT)
Age: 62
End: 2022-11-11

## 2022-12-10 ENCOUNTER — NON-APPOINTMENT (OUTPATIENT)
Age: 62
End: 2022-12-10

## 2022-12-13 ENCOUNTER — RX RENEWAL (OUTPATIENT)
Age: 62
End: 2022-12-13

## 2023-01-17 ENCOUNTER — NON-APPOINTMENT (OUTPATIENT)
Age: 63
End: 2023-01-17

## 2023-02-14 ENCOUNTER — RX RENEWAL (OUTPATIENT)
Age: 63
End: 2023-02-14

## 2023-02-28 ENCOUNTER — APPOINTMENT (OUTPATIENT)
Dept: CARDIOLOGY | Facility: CLINIC | Age: 63
End: 2023-02-28
Payer: COMMERCIAL

## 2023-02-28 VITALS
HEART RATE: 79 BPM | OXYGEN SATURATION: 99 % | HEIGHT: 72 IN | WEIGHT: 237 LBS | SYSTOLIC BLOOD PRESSURE: 132 MMHG | DIASTOLIC BLOOD PRESSURE: 76 MMHG | BODY MASS INDEX: 32.1 KG/M2

## 2023-02-28 PROCEDURE — 99214 OFFICE O/P EST MOD 30 MIN: CPT

## 2023-02-28 NOTE — ASSESSMENT
[FreeTextEntry1] : Paroxysmal atrial fibrillation on Multaq as antiarrhythmic therapy and Eliquis as anticoagulant.  Hypertension reasonable control.   Sleep apnea.\par \par

## 2023-02-28 NOTE — CARDIOLOGY SUMMARY
[de-identified] : November 1, 2022 sinus rhythm [de-identified] : Stress test December 2019 no ischemia [de-identified] : Echo 2019 normal LV function [de-identified] : November 2021  nonobstructive CAD

## 2023-02-28 NOTE — DISCUSSION/SUMMARY
[Patient] : the patient [Risks] : risks [Benefits] : benefits [Alternatives] : alternatives [___ Month(s)] : in [unfilled] month(s) [FreeTextEntry1] : Continue Cardizem  240 mg daily.  Continue Eliquis.  Multiple questions addressed with patient.  Discussed appropriate diet and weight loss as well as exercise.  Continue Multaq.\par \par Should have repeat blood testing during his upcoming visit with PMD.  Follow-up with me in 3 to 4 months.  He establish pulmonary evaluation regarding sleep apnea in setting of A-fib.  Options discussed and questions addressed with patient and wife.

## 2023-02-28 NOTE — HISTORY OF PRESENT ILLNESS
[FreeTextEntry1] : Seen for follow-up accompanied by his wife. \par \par He has had less episodes of A-fib since last visit since increasing diltiazem.  Episodes can last up to 7 hours however.  He sometimes takes extra short acting diltiazem during the episodes.  No chest pain shortness of breath leg edema.  Has not followed up on sleep study.  Clines EKG today.

## 2023-03-13 ENCOUNTER — APPOINTMENT (OUTPATIENT)
Dept: ORTHOPEDIC SURGERY | Facility: CLINIC | Age: 63
End: 2023-03-13
Payer: COMMERCIAL

## 2023-03-13 PROCEDURE — 20550 NJX 1 TENDON SHEATH/LIGAMENT: CPT | Mod: RT

## 2023-03-13 PROCEDURE — 99214 OFFICE O/P EST MOD 30 MIN: CPT | Mod: 25

## 2023-03-13 RX ORDER — LIDOCAINE HYDROCHLORIDE 10 MG/ML
1 INJECTION, SOLUTION INFILTRATION; PERINEURAL
Refills: 0 | Status: COMPLETED | OUTPATIENT
Start: 2023-03-13

## 2023-03-13 RX ORDER — BETAMETHA AC,SOD PHOS/WATER/PF 6 MG/ML
6 (3-3) VIAL (ML) INJECTION
Qty: 1 | Refills: 0 | Status: COMPLETED | OUTPATIENT
Start: 2023-03-13

## 2023-03-13 RX ADMIN — BETAMETHASONE SODIUM PHOSPHATE AND BETAMETHASONE ACETATE 1 MG/ML: 3; 3 INJECTION, SUSPENSION INTRA-ARTICULAR; INTRALESIONAL; INTRAMUSCULAR; SOFT TISSUE at 00:00

## 2023-03-13 RX ADMIN — LIDOCAINE HYDROCHLORIDE 0.5 %: 10 INJECTION, SOLUTION INFILTRATION; PERINEURAL at 00:00

## 2023-03-13 NOTE — PHYSICAL EXAM
[de-identified] : - Constitutional: This is a male in no obvious distress.  He is accompanied by his wife today. \par - Psych: Patient is alert and oriented to person, place and time.  Patient has a normal mood and affect.\par - Cardiovascular: Normal pulses throughout the upper extremities.  No significant varicosities are noted in the upper extremities. \par - Neuro: Strength and sensation are intact throughout the upper extremities.  Patient has normal coordination.\par - Respiratory:  Patient exhibits no evidence of shortness of breath or difficulty breathing.\par - Skin: No rashes, lesions, or other abnormalities are noted in the upper extremities.\par \par ---\par \par Examination of his right hand demonstrates swelling and tenderness along the A1 pulley of the middle finger.  There is triggering.  There is no triggering of the other digits.  He is neurovascularly intact distally.\par \par Examination of his left hand demonstrates mild tenderness along the A1 pulley of the little finger without swelling.  There is mild triggering.  There is no triggering of the other digits.  He is neurovascularly intact distally.

## 2023-03-13 NOTE — HISTORY OF PRESENT ILLNESS
[FreeTextEntry1] : Less than 1 year status post left ring finger trigger cortisone injection #2.  He was previously given 1 cortisone injection at his left middle finger trigger finger.  He has also been previously given in the past 2 cortisone injections at his right trigger thumb and left index finger trigger finger.\par \par He returns today, as he has noted pain and triggering of his right middle finger the past month.  He also has mild triggering of his left little finger which is not causing him symptoms.  His other fingers are doing well.\par \par He is a pre-diabetic but he is not treated for Diabetes.\par \par He is accompanied by his wife today.

## 2023-03-13 NOTE — DISCUSSION/SUMMARY
[FreeTextEntry1] : He has developed a right middle finger trigger finger and has a mild left little finger trigger finger.\par \par I had a discussion regarding today's visit, the diagnosis and treatment recommendations and options.  We also discussed changes since the last visit.  At this time, I recommended a cortisone injection at his right middle finger trigger finger.  With regard to the left little finger trigger finger, as his symptoms are mild, he deferred a cortisone injection.\par \par The patient has agreed to the above plan of management and has expressed full understanding.  All questions were fully answered to the patient's satisfaction.\par \par My cumulative time spent on today's visit was greater than 30 minutes and included: Preparation for the visit, review of the medical records, review of pertinent diagnostic studies, examination and counseling of the patient on the above diagnosis, treatment plan and prognosis, orders of diagnostic tests, medications and/or appropriate procedures and documentation in the medical records of today's visit.

## 2023-03-18 ENCOUNTER — NON-APPOINTMENT (OUTPATIENT)
Age: 63
End: 2023-03-18

## 2023-04-18 ENCOUNTER — RX RENEWAL (OUTPATIENT)
Age: 63
End: 2023-04-18

## 2023-04-21 ENCOUNTER — APPOINTMENT (OUTPATIENT)
Dept: ORTHOPEDIC SURGERY | Facility: CLINIC | Age: 63
End: 2023-04-21
Payer: COMMERCIAL

## 2023-04-21 PROCEDURE — 99214 OFFICE O/P EST MOD 30 MIN: CPT | Mod: 25

## 2023-04-21 PROCEDURE — 20550 NJX 1 TENDON SHEATH/LIGAMENT: CPT | Mod: LT,F4

## 2023-04-21 RX ORDER — BETAMETHASONE ACETATE AND BETAMETHASONE SODIUM PHOSPHATE 6MG/ML PRESERVATIVE FREE 3; 3 MG/ML; MG/ML
6 (3-3) INJECTION, SUSPENSION EPIDURAL; INTRAMUSCULAR; INTRASPINAL; INTRATHECAL
Refills: 0 | Status: COMPLETED | OUTPATIENT
Start: 2023-04-21

## 2023-04-21 RX ORDER — LIDOCAINE HYDROCHLORIDE 10 MG/ML
1 INJECTION, SOLUTION INFILTRATION; PERINEURAL
Refills: 0 | Status: COMPLETED | OUTPATIENT
Start: 2023-04-21

## 2023-04-21 RX ADMIN — BETAMETHASONE ACETATE AND BETAMETHASONE SODIUM PHOSPHATE 6MG/ML PRESERVATIVE FREE MG/ML: 3; 3 INJECTION, SUSPENSION EPIDURAL; INTRAMUSCULAR; INTRASPINAL; INTRATHECAL at 00:00

## 2023-04-21 RX ADMIN — Medication %: at 00:00

## 2023-04-21 NOTE — DISCUSSION/SUMMARY
[FreeTextEntry1] : I had a discussion regarding today's visit, the diagnosis and treatment recommendations and options.  We also discussed changes since the last visit.\par \par With regard to the right middle finger, given his improvement post cortisone injection, greater than 4 weeks ago, I recommended observation. He will follow up as needed, according to his symptoms in this regard.\par \par With regard to the left little finger, given the progression of his symptoms since his last visit, he agreed to proceed with a cortisone injection at the flexor tendon sheath. As a pre-diabetic, he understands that this will likely elevate his blood sugars in the short-term.\par \par The patient has agreed to the above plan of management and has expressed full understanding.  All questions were fully answered to the patient's satisfaction.\par \par My cumulative time spent on today's visit was greater than 30 minutes and included: Preparation for the visit, review of the medical records, review of pertinent diagnostic studies, examination and counseling of the patient on the above diagnosis, treatment plan and prognosis, orders of diagnostic tests, medications and/or appropriate procedures and documentation in the medical records of today's visit.

## 2023-04-21 NOTE — PHYSICAL EXAM
[de-identified] : - Constitutional: This is a male in no obvious distress.  He is accompanied by his wife today. \par - Psych: Patient is alert and oriented to person, place and time.  Patient has a normal mood and affect.\par - Cardiovascular: Normal pulses throughout the upper extremities.  No significant varicosities are noted in the upper extremities. \par - Neuro: Strength and sensation are intact throughout the upper extremities.  Patient has normal coordination.\par - Respiratory:  Patient exhibits no evidence of shortness of breath or difficulty breathing.\par - Skin: No rashes, lesions, or other abnormalities are noted in the upper extremities.\par \par ---\par \par Examination of his right hand demonstrates no further swelling or tenderness along the A1 pulley of the middle finger.  There is no residual triggering.  There is no triggering of the other digits.  He is neurovascularly intact distally.\par \par Examination of his left hand demonstrates increased swelling and tenderness tenderness along the A1 pulley of the little finger.  There is triggering.  There is no triggering of the other digits.  He is neurovascularly intact distally.

## 2023-04-21 NOTE — PROCEDURE
[FreeTextEntry1] : -  After a discussion of risks and benefits, the patient agreed to proceed with a cortisone injection.  \par -  Side: Left\par -  Finger: Little finger trigger finger\par -  Medications: 0.5 cc of 1% Lidocaine and 1 cc of betamethasone, using sterile technique.\par -  Patient tolerated the procedure well, without complications.\par -  Patient was told that the symptoms may worsen for a day or two, and should then begin to improve. \par -  Instructions: Patient was instructed on activity modification for the next several days.\par -  Follow-up: Within 4 weeks to assess response to the injection.

## 2023-04-21 NOTE — ADDENDUM
[FreeTextEntry1] : I, Sherry Sánchez, acted solely as a scribe for Dr. Clement on this date on 04/21/2023.

## 2023-04-21 NOTE — HISTORY OF PRESENT ILLNESS
[FreeTextEntry1] : Follow-up regarding right middle finger trigger finger and has a mild left little finger trigger finger.\par \par He was seen in the office 39 days ago and given a cortisone injection to the more symptomatic right middle finger trigger finger.\par \par He has been previously given 2 cortisone injections at his left ring finger trigger, 1 cortisone injection at his left middle finger trigger finger, 2 cortisone injections at his right trigger thumb and 1 cortisone injection left index finger trigger finger.\par \par He returns today, and notes the injection at the right middle finger given at his last office visit, provided him with relief and he overall feels about 95 percent  better in this regard. With that being said however, the left little finger symptoms are worsened. He rates his pain in this regard as a 7 out of 10. \par \par He is a pre-diabetic but he is not treated for Diabetes.\par \par He is accompanied by his wife today.

## 2023-05-01 NOTE — CARDIOLOGY SUMMARY
[___] : [unfilled] [No Ischemia] : no Ischemia [None] : normal LV function Bilobed Transposition Flap Text: The defect edges were debeveled with a #15 scalpel blade.  Given the location of the defect and the proximity to free margins a bilobed transposition flap was deemed most appropriate.  Using a sterile surgical marker, an appropriate bilobe flap drawn around the defect.    The area thus outlined was incised deep to adipose tissue with a #15 scalpel blade.  The skin margins were undermined to an appropriate distance in all directions utilizing iris scissors.

## 2023-05-22 ENCOUNTER — RX RENEWAL (OUTPATIENT)
Age: 63
End: 2023-05-22

## 2023-06-06 ENCOUNTER — APPOINTMENT (OUTPATIENT)
Dept: CARDIOLOGY | Facility: CLINIC | Age: 63
End: 2023-06-06

## 2023-06-23 ENCOUNTER — NON-APPOINTMENT (OUTPATIENT)
Age: 63
End: 2023-06-23

## 2023-07-05 ENCOUNTER — APPOINTMENT (OUTPATIENT)
Dept: ORTHOPEDIC SURGERY | Facility: CLINIC | Age: 63
End: 2023-07-05
Payer: COMMERCIAL

## 2023-07-05 PROCEDURE — 20550 NJX 1 TENDON SHEATH/LIGAMENT: CPT | Mod: RT

## 2023-07-05 PROCEDURE — 99214 OFFICE O/P EST MOD 30 MIN: CPT | Mod: 25

## 2023-07-05 RX ORDER — LIDOCAINE HYDROCHLORIDE 10 MG/ML
1 INJECTION, SOLUTION INFILTRATION; PERINEURAL
Refills: 0 | Status: COMPLETED | OUTPATIENT
Start: 2023-07-05

## 2023-07-05 RX ORDER — TRIAMCINOLONE ACETONIDE 40 MG/ML
40 SUSPENSION INTRA-ARTERIAL; INTRAMUSCULAR
Qty: 1 | Refills: 0 | Status: COMPLETED | OUTPATIENT
Start: 2023-07-05

## 2023-07-05 RX ADMIN — Medication %: at 00:00

## 2023-07-05 RX ADMIN — TRIAMCINOLONE ACETONIDE 0 MG/ML: 40 INJECTION, SUSPENSION INTRA-ARTICULAR; INTRAMUSCULAR at 00:00

## 2023-07-05 NOTE — PROCEDURE
[FreeTextEntry1] : -  After a discussion of risks and benefits, the patient agreed to proceed with a cortisone injection.  \par -  Side: Left\par -  Finger: Little finger trigger finger\par -  Medications: 0.5 cc of 1% Lidocaine and 1 cc of Kenalog, 40mg/cc, using sterile technique.\par -  Patient tolerated the procedure well, without complications.\par -  Patient was told that the symptoms may worsen for a day or two, and should then begin to improve. \par -  Instructions: Patient was instructed on activity modification for the next several days.\par -  Follow-up: According to his symptoms. \par \par ---\par \par -  After a discussion of risks and benefits, the patient agreed to proceed with a cortisone injection.  \par -  Side: Right\par -  Finger: Middle finger trigger finger\par -  Medications: 0.5 cc of 1% Lidocaine and 1 cc of Kenalog, 40mg/cc, using sterile technique.\par -  Patient tolerated the procedure well, without complications.\par -  Patient was told that the symptoms may worsen for a day or two, and should then begin to improve. \par -  Instructions: Patient was instructed on activity modification for the next several days.\par -  Follow-up: According to his symptoms.

## 2023-07-05 NOTE — PHYSICAL EXAM
[de-identified] : - Constitutional: This is a male in no obvious distress.\par - Psych: Patient is alert and oriented to person, place and time.  Patient has a normal mood and affect.\par - Cardiovascular: Normal pulses throughout the upper extremities.  No significant varicosities are noted in the upper extremities. \par - Neuro: Strength and sensation are intact throughout the upper extremities.  Patient has normal coordination.\par - Respiratory:  Patient exhibits no evidence of shortness of breath or difficulty breathing.\par - Skin: No rashes, lesions, or other abnormalities are noted in the upper extremities.\par \par ---\par \par Examination of his right hand demonstrates swelling and tenderness along the A1 pulley of the middle finger.  There is triggering.  There is no triggering of the other digits.  He is neurovascularly intact distally.\par \par Examination of his left hand demonstrates swelling and tenderness tenderness along the A1 pulley of the little finger.  There is triggering.  There is no triggering of the other digits.  He is neurovascularly intact distally.

## 2023-07-05 NOTE — ADDENDUM
[FreeTextEntry1] : I, Sherry Sánchez, acted solely as a scribe for Dr. Clement on this date on 07/05/2023.

## 2023-07-05 NOTE — HISTORY OF PRESENT ILLNESS
[FreeTextEntry1] : 2 1/2 month status post left little finger trigger cortisone injection #1.  He has been previously given 1 cortisone injection at his right middle finger trigger finger, 2 cortisone injections at his left ring finger trigger, 1 cortisone injection at his left middle finger trigger finger, 2 cortisone injections at his right trigger thumb and 1 cortisone injection left index finger trigger finger.\par \par He returns today, with recurrent symptoms at the left little finger. He notes just mild improvements after the recent cortisone injection however the finger has continued to trigger. He reports notable pain at the right middle finger and is tender over the A1 pulley. His pain and stiffness to the digits overall are worse in the morning.\par \par He is a pre-diabetic but he is not treated for Diabetes.

## 2023-07-05 NOTE — DISCUSSION/SUMMARY
[FreeTextEntry1] : I had a discussion regarding today's visit, the diagnosis and treatment recommendations and options.  We also discussed changes since the last visit.  With regard to the left little finger, given his recurrent symptoms, we discussed further treatment options of a repeat cortisone injection versus surgical release. He deferred surgical release and has opted for a repeat cortisone injection at the flexor tendon sheath. He understands that if he does ultimately require surgery, he will be unable to undergo surgical release for 3 months post cortisone injection, given the increased risk of postoperative infection posed by the steroid. As a pre-diabetic, he understands that this will likely elevate his blood sugars in the short-term. Finally, he also understands that the cortisone injection may not provide him with long term relief, and if it does not, I would recommend he return to the office for further treatment recommendations. \par \par With regard to the right middle finger, given his recurrent symptoms, we discussed further treatment options of a repeat cortisone injection versus surgical release. He deferred surgical release and has opted for a repeat cortisone injection at the flexor tendon sheath. He understands that if he does ultimately require surgery, he will be unable to undergo surgical release for 3 months post cortisone injection, given the increased risk of postoperative infection posed by the steroid. As a pre-diabetic, he understands that this will likely elevate his blood sugars in the short-term. Finally, he also understands that the cortisone injection may not provide him with long term relief, and if it does not, I would recommend he return to the office for further treatment recommendations. \par \par The patient has agreed to the above plan of management and has expressed full understanding.  All questions were fully answered to the patient's satisfaction.\par \par My cumulative time spent on today's visit was greater than 30 minutes and included: Preparation for the visit, review of the medical records, review of pertinent diagnostic studies, examination and counseling of the patient on the above diagnosis, treatment plan and prognosis, orders of diagnostic tests, medications and/or appropriate procedures and documentation in the medical records of today's visit.

## 2023-07-05 NOTE — END OF VISIT
[FreeTextEntry3] : This note was written by Sherry Sánchez on 07/05/2023 acting solely as a scribe for Dr. Rebel Clement.\par  \par All medical record entries made by the Scribe were at my, Dr. Rebel Clement, direction and personally dictated by me on 07/05/2023. I have personally reviewed the chart and agree that the record accurately reflects my personal performance of the history, physical exam, assessment and plan.

## 2023-08-23 ENCOUNTER — NON-APPOINTMENT (OUTPATIENT)
Age: 63
End: 2023-08-23

## 2023-08-24 ENCOUNTER — NON-APPOINTMENT (OUTPATIENT)
Age: 63
End: 2023-08-24

## 2023-09-07 ENCOUNTER — APPOINTMENT (OUTPATIENT)
Dept: CARDIOLOGY | Facility: CLINIC | Age: 63
End: 2023-09-07
Payer: COMMERCIAL

## 2023-09-07 ENCOUNTER — NON-APPOINTMENT (OUTPATIENT)
Age: 63
End: 2023-09-07

## 2023-09-07 VITALS
BODY MASS INDEX: 30.75 KG/M2 | DIASTOLIC BLOOD PRESSURE: 82 MMHG | WEIGHT: 227 LBS | HEART RATE: 71 BPM | SYSTOLIC BLOOD PRESSURE: 122 MMHG | OXYGEN SATURATION: 97 % | RESPIRATION RATE: 17 BRPM | HEIGHT: 72 IN

## 2023-09-07 LAB
ALBUMIN SERPL ELPH-MCNC: 4.9 G/DL
ALP BLD-CCNC: 69 U/L
ALT SERPL-CCNC: 26 U/L
ANION GAP SERPL CALC-SCNC: 13 MMOL/L
AST SERPL-CCNC: 16 U/L
BILIRUB SERPL-MCNC: 0.5 MG/DL
BUN SERPL-MCNC: 9 MG/DL
CALCIUM SERPL-MCNC: 10.4 MG/DL
CHLORIDE SERPL-SCNC: 102 MMOL/L
CHOLEST SERPL-MCNC: 214 MG/DL
CO2 SERPL-SCNC: 24 MMOL/L
CREAT SERPL-MCNC: 1.05 MG/DL
EGFR: 80 ML/MIN/1.73M2
GLUCOSE SERPL-MCNC: 106 MG/DL
HDLC SERPL-MCNC: 44 MG/DL
LDLC SERPL CALC-MCNC: 146 MG/DL
MAGNESIUM SERPL-MCNC: 2.4 MG/DL
NONHDLC SERPL-MCNC: 170 MG/DL
POTASSIUM SERPL-SCNC: 4.8 MMOL/L
PROT SERPL-MCNC: 7.2 G/DL
SODIUM SERPL-SCNC: 139 MMOL/L
T3FREE SERPL-MCNC: 3.54 PG/ML
T4 FREE SERPL-MCNC: 1.1 NG/DL
TRIGL SERPL-MCNC: 129 MG/DL
TSH SERPL-ACNC: 2.32 UIU/ML

## 2023-09-07 PROCEDURE — 93000 ELECTROCARDIOGRAM COMPLETE: CPT

## 2023-09-07 PROCEDURE — 99215 OFFICE O/P EST HI 40 MIN: CPT | Mod: 25

## 2023-09-07 NOTE — HISTORY OF PRESENT ILLNESS
[FreeTextEntry1] : Presents today having been an ER at [facility] for A-fib which reverted.  Ports he was working hard outside as a  and developed palpitations racing heart felt weak and poor and evaluated after ambulance transport.  He has been taking medication as directed.  Has not followed up on sleep apnea issue.  Has lost weight although only partly voluntarily.  Does have anxiety issues.  Reports COVID about 3 weeks ago.  He thinks he has about 30 episodes of A-fib annually which she believes are happening more frequently and lasting longer.  No chest pain dyspnea.  No edema.

## 2023-09-07 NOTE — ASSESSMENT
[FreeTextEntry1] : Paroxysmal atrial fibrillation on Multaq as antiarrhythmic therapy and Eliquis as anticoagulant.  Hypertension reasonable control.   Sleep apnea.  Improved overweight.  Anxiety.

## 2023-09-07 NOTE — REASON FOR VISIT
[Symptom and Test Evaluation] : symptom and test evaluation [Arrhythmia/ECG Abnorrmalities] : arrhythmia/ECG abnormalities [Hypertension] : hypertension [FreeTextEntry3] : Scarascia

## 2023-09-07 NOTE — DISCUSSION/SUMMARY
[Patient] : the patient [Risks] : risks [Benefits] : benefits [Alternatives] : alternatives [___ Month(s)] : in [unfilled] month(s) [FreeTextEntry1] : Continue Cardizem. Continue Eliquis.  We will try small dose of beta-blocker in the morning.  Multiple questions addressed with patient.  Discussed appropriate diet and weight loss as well as exercise.  Continue Multaq.  EP reassessment regarding ablation.  Multiple questions addressed with patient.  Lab work ordered.  Follow-up with PMD as well.   [EKG obtained to assist in diagnosis and management of assessed problem(s)] : EKG obtained to assist in diagnosis and management of assessed problem(s)

## 2023-09-07 NOTE — CARDIOLOGY SUMMARY
[de-identified] : November 1, 2022 sinus rhythm September 7, 2023 sinus rhythm atrial abnormality [de-identified] : Stress test December 2019 no ischemia [de-identified] : Echo 2019 normal LV function [de-identified] : November 2021  nonobstructive CAD

## 2023-09-07 NOTE — REVIEW OF SYSTEMS
[Weight Gain (___ Lbs)] : no recent weight gain [Weight Loss (___ Lbs)] : [unfilled] ~Ulb weight loss [SOB] : no shortness of breath [Palpitations] : palpitations [Syncope] : no syncope [Cough] : no cough

## 2023-09-18 ENCOUNTER — APPOINTMENT (OUTPATIENT)
Dept: PULMONOLOGY | Facility: CLINIC | Age: 63
End: 2023-09-18

## 2023-10-10 ENCOUNTER — APPOINTMENT (OUTPATIENT)
Dept: PULMONOLOGY | Facility: CLINIC | Age: 63
End: 2023-10-10
Payer: COMMERCIAL

## 2023-10-10 VITALS
OXYGEN SATURATION: 95 % | HEART RATE: 58 BPM | RESPIRATION RATE: 15 BRPM | TEMPERATURE: 97.9 F | SYSTOLIC BLOOD PRESSURE: 119 MMHG | BODY MASS INDEX: 31.32 KG/M2 | DIASTOLIC BLOOD PRESSURE: 77 MMHG | HEIGHT: 72 IN | WEIGHT: 231.25 LBS

## 2023-10-10 PROCEDURE — 99215 OFFICE O/P EST HI 40 MIN: CPT

## 2023-10-10 RX ORDER — DILTIAZEM HYDROCHLORIDE 240 MG/1
240 TABLET, EXTENDED RELEASE ORAL DAILY
Qty: 90 | Refills: 3 | Status: COMPLETED | COMMUNITY
Start: 2022-05-03 | End: 2023-10-10

## 2023-10-11 ENCOUNTER — NON-APPOINTMENT (OUTPATIENT)
Age: 63
End: 2023-10-11

## 2023-10-11 ENCOUNTER — APPOINTMENT (OUTPATIENT)
Dept: ELECTROPHYSIOLOGY | Facility: CLINIC | Age: 63
End: 2023-10-11
Payer: COMMERCIAL

## 2023-10-11 VITALS
DIASTOLIC BLOOD PRESSURE: 75 MMHG | SYSTOLIC BLOOD PRESSURE: 120 MMHG | WEIGHT: 230 LBS | OXYGEN SATURATION: 95 % | HEIGHT: 72 IN | RESPIRATION RATE: 14 BRPM | HEART RATE: 54 BPM | BODY MASS INDEX: 31.15 KG/M2

## 2023-10-11 PROCEDURE — 93000 ELECTROCARDIOGRAM COMPLETE: CPT

## 2023-10-11 PROCEDURE — 99204 OFFICE O/P NEW MOD 45 MIN: CPT | Mod: 25

## 2023-10-11 RX ORDER — FENOFIBRATE 48 MG/1
48 TABLET ORAL DAILY
Qty: 90 | Refills: 3 | Status: DISCONTINUED | COMMUNITY
End: 2023-10-11

## 2023-10-11 RX ORDER — APIXABAN 5 MG/1
TABLET, FILM COATED ORAL
Refills: 0 | Status: DISCONTINUED | COMMUNITY
End: 2023-10-11

## 2023-10-11 RX ORDER — FENOFIBRATE 134 MG/1
134 CAPSULE ORAL
Refills: 0 | Status: ACTIVE | COMMUNITY

## 2023-10-20 ENCOUNTER — RX RENEWAL (OUTPATIENT)
Age: 63
End: 2023-10-20

## 2023-10-27 ENCOUNTER — NON-APPOINTMENT (OUTPATIENT)
Age: 63
End: 2023-10-27

## 2023-11-15 ENCOUNTER — RX RENEWAL (OUTPATIENT)
Age: 63
End: 2023-11-15

## 2023-11-15 RX ORDER — DILTIAZEM HYDROCHLORIDE 240 MG/1
240 CAPSULE, EXTENDED RELEASE ORAL
Qty: 90 | Refills: 3 | Status: ACTIVE | COMMUNITY
Start: 2020-06-15 | End: 1900-01-01

## 2023-11-16 ENCOUNTER — APPOINTMENT (OUTPATIENT)
Dept: CARDIOLOGY | Facility: CLINIC | Age: 63
End: 2023-11-16

## 2023-12-12 RX ORDER — DILTIAZEM HYDROCHLORIDE 60 MG/1
60 TABLET ORAL
Qty: 60 | Refills: 2 | Status: ACTIVE | COMMUNITY
Start: 2020-11-24 | End: 1900-01-01

## 2024-01-16 ENCOUNTER — APPOINTMENT (OUTPATIENT)
Dept: CARDIOLOGY | Facility: CLINIC | Age: 64
End: 2024-01-16
Payer: COMMERCIAL

## 2024-01-16 ENCOUNTER — APPOINTMENT (OUTPATIENT)
Dept: SLEEP CENTER | Facility: CLINIC | Age: 64
End: 2024-01-16
Payer: COMMERCIAL

## 2024-01-16 ENCOUNTER — OUTPATIENT (OUTPATIENT)
Dept: OUTPATIENT SERVICES | Facility: HOSPITAL | Age: 64
LOS: 1 days | End: 2024-01-16
Payer: COMMERCIAL

## 2024-01-16 VITALS
WEIGHT: 239 LBS | HEIGHT: 72 IN | HEART RATE: 62 BPM | DIASTOLIC BLOOD PRESSURE: 80 MMHG | SYSTOLIC BLOOD PRESSURE: 138 MMHG | BODY MASS INDEX: 32.37 KG/M2 | OXYGEN SATURATION: 98 %

## 2024-01-16 PROCEDURE — 99214 OFFICE O/P EST MOD 30 MIN: CPT

## 2024-01-16 PROCEDURE — 95811 POLYSOM 6/>YRS CPAP 4/> PARM: CPT

## 2024-01-16 PROCEDURE — 95811 POLYSOM 6/>YRS CPAP 4/> PARM: CPT | Mod: 26

## 2024-01-16 NOTE — DISCUSSION/SUMMARY
[Patient] : the patient [Risks] : risks [Benefits] : benefits [Alternatives] : alternatives [___ Month(s)] : in [unfilled] month(s) [FreeTextEntry1] : Continue Cardizem. Continue Eliquis.  He wishes to try increase beta-blocker we will double dose to 25 twice daily multiple questions addressed with patient.  Discussed appropriate diet and weight loss as well as exercise.  Continue Multaq.  EP reassessment regarding ablation.  Multiple questions addressed with patient.  .  Follow-up with PMD as well.

## 2024-01-16 NOTE — ASSESSMENT
[FreeTextEntry1] : Paroxysmal A-fib on rhythm control strategy currently.  Hypertension overweight.  Nonobstructive coronary atherosclerosis

## 2024-01-16 NOTE — CARDIOLOGY SUMMARY
[de-identified] : November 1, 2022 sinus rhythm September 7, 2023 sinus rhythm atrial abnormality [de-identified] : Stress test December 2019 no ischemia [de-identified] : Echo 2019 normal LV function [de-identified] : November 2021  nonobstructive CAD

## 2024-01-17 DIAGNOSIS — G47.33 OBSTRUCTIVE SLEEP APNEA (ADULT) (PEDIATRIC): ICD-10-CM

## 2024-01-22 ENCOUNTER — RX RENEWAL (OUTPATIENT)
Age: 64
End: 2024-01-22

## 2024-01-24 ENCOUNTER — APPOINTMENT (OUTPATIENT)
Dept: PULMONOLOGY | Facility: CLINIC | Age: 64
End: 2024-01-24
Payer: COMMERCIAL

## 2024-01-24 ENCOUNTER — NON-APPOINTMENT (OUTPATIENT)
Age: 64
End: 2024-01-24

## 2024-01-24 PROCEDURE — 99442: CPT

## 2024-02-26 ENCOUNTER — RX RENEWAL (OUTPATIENT)
Age: 64
End: 2024-02-26

## 2024-02-26 RX ORDER — APIXABAN 5 MG/1
5 TABLET, FILM COATED ORAL
Qty: 180 | Refills: 3 | Status: ACTIVE | COMMUNITY
Start: 2023-06-24 | End: 1900-01-01

## 2024-02-26 RX ORDER — DRONEDARONE 400 MG/1
400 TABLET, FILM COATED ORAL TWICE DAILY
Qty: 180 | Refills: 3 | Status: ACTIVE | COMMUNITY
Start: 2020-11-24 | End: 1900-01-01

## 2024-03-13 ENCOUNTER — APPOINTMENT (OUTPATIENT)
Dept: PULMONOLOGY | Facility: CLINIC | Age: 64
End: 2024-03-13
Payer: COMMERCIAL

## 2024-03-13 DIAGNOSIS — G47.33 OBSTRUCTIVE SLEEP APNEA (ADULT) (PEDIATRIC): ICD-10-CM

## 2024-03-13 PROCEDURE — 99213 OFFICE O/P EST LOW 20 MIN: CPT

## 2024-03-13 NOTE — REVIEW OF SYSTEMS
[A.M. Dry Mouth] : a.m. dry mouth [Obesity] : obesity [Negative] : Psychiatric [Nasal Congestion] : no nasal congestion [Snoring] : no snoring [Postnasal Drip] : no postnasal drip [Witnessed Apneas] : no witnessed apnea [Shortness Of Breath] : no shortness of breath [Thyroid Disease] : no thyroid disease [Diabetes] : no diabetes

## 2024-03-13 NOTE — PHYSICAL EXAM
[General Appearance - Well Developed] : well developed [Normal Appearance] : normal appearance [Normal Conjunctiva] : the conjunctiva exhibited no abnormalities [Oriented To Time, Place, And Person] : oriented to person, place, and time [Mood] : the mood was normal

## 2024-03-13 NOTE — HISTORY OF PRESENT ILLNESS
[FreeTextEntry1] : 63 year-old male with MODERATE LAUREN (AHI 20) who now presents via video for CPAP compliance. PMH: Anxiety, Arthritis, HLD, HTN, PAF.  DX PSG [address]: AHI 20. T<90 2%. Lowest sat 76%. Brief periods of AF. DX CPAP titration 2024 CPAP at pressure of 11 CMH20   TX: [employer] @ setting of 11 CMH20 setup on 2024 with DME: [facility]  Patient reports he started CPAP therapy 1 month with benefit. He endorses he is no longer snoring, wakes up in AM more refreshed and nocturnal awakenings have decreased to 1-2 per night from 2-3 per night. Patient reports since starting CPAP therapy, episodes of Afib have also decreased. Patient is currently using FFM which is uncomfortable and leaking.   Sleeps max 5-hours between 1:30am-6am. Daily 30-minute early evening nap - feels energized. No insomnia. Awakens 1-2 times briefly to urinate during which he may check his phone / his accounts, or trade, then returns to sleep quickly.  EPWORTH SLEEPINESS SCALE How likely are you to doze off or fall asleep in the situations described below, in contrast to feeling just tired? This refers to your usual way of life in recent times. Even if you haven't done some of these things recently, try to work out how they would have affected you. Use the following scale to choose one most appropriate number for each situation.......Chance of dozin= never. 1= slight. 2= moderate. 3= high.  CHANCE OF DOZING............SITUATION 1.............................................Sitting and reading 1.............................................Watching TV 1.............................................Sitting inactive in a public place (eg a theatre or a meeting) 0.............................................As a passenger in a car for an hour without a break 3.............................................Lying down to rest in the afternoon when circumstances permit 0.............................................Sitting and talking to someone 0.............................................Sitting quietly after lunch without alcohol 0.............................................In a car, while stopped for a few minutes in traffic  6............................................TOTAL ESS SCORE

## 2024-03-13 NOTE — REASON FOR VISIT
[Follow-Up] : a follow-up visit [Sleep Apnea] : sleep apnea [Home] : at home, [unfilled] , at the time of the visit. [Medical Office: ([facility])___] : at the medical office located in  [Patient] : the patient

## 2024-03-13 NOTE — ASSESSMENT
[FreeTextEntry1] : 63 year-old male with MODERATE LAUREN (AHI 20) who now presents via video for CPAP compliance.  Discussed with patient CPAP compliance data:  Compliant 93% of days, 83% for >4-hrs, average 4hrs 49mins. AHI 3.7/hr Leaks 92.8L/m. Patient is deriving benefit from using CPAP. Recommended patient to use CPAP for the entirety of sleep at night and during nap in afternoon.  Recommended mask fitting with [facility] for mask leaks. Tasked sleep  to schedule an appointment for mask fitting with [facility].  Explained the rationale for treatment of sleep apnea including its effect on quality of life and long-term cardiovascular risk.  Annual follow up or sooner if any issues.

## 2024-03-14 ENCOUNTER — APPOINTMENT (OUTPATIENT)
Dept: PULMONOLOGY | Facility: CLINIC | Age: 64
End: 2024-03-14
Payer: COMMERCIAL

## 2024-03-14 VITALS
BODY MASS INDEX: 31.97 KG/M2 | OXYGEN SATURATION: 96 % | SYSTOLIC BLOOD PRESSURE: 132 MMHG | DIASTOLIC BLOOD PRESSURE: 81 MMHG | RESPIRATION RATE: 15 BRPM | HEIGHT: 72 IN | WEIGHT: 236 LBS | HEART RATE: 62 BPM

## 2024-03-14 PROCEDURE — 94660 CPAP INITIATION&MGMT: CPT

## 2024-03-14 NOTE — PROCEDURE
[FreeTextEntry1] : Maskfitting Patient is currently using a [employer] Tami full face mask, size s/m. Patient was fit with multiple masks but I still could not get a good seal with any of them. I tried SImplus FF (M), F30i (M).   The mask with the best seal was the Tami full face mask.  I could not locate a spot on his face where the air leak was coming from but the machine kept reporting a high leak.  The patient has facial hair and is considering trimming it down a little to see if that will improve the seal.  Patient will follow up with us within 2 weeks.

## 2024-04-16 ENCOUNTER — APPOINTMENT (OUTPATIENT)
Dept: CARDIOLOGY | Facility: CLINIC | Age: 64
End: 2024-04-16
Payer: COMMERCIAL

## 2024-04-16 VITALS
BODY MASS INDEX: 32.1 KG/M2 | HEIGHT: 72 IN | HEART RATE: 56 BPM | OXYGEN SATURATION: 97 % | DIASTOLIC BLOOD PRESSURE: 70 MMHG | SYSTOLIC BLOOD PRESSURE: 122 MMHG | WEIGHT: 237 LBS

## 2024-04-16 DIAGNOSIS — I48.0 PAROXYSMAL ATRIAL FIBRILLATION: ICD-10-CM

## 2024-04-16 DIAGNOSIS — I10 ESSENTIAL (PRIMARY) HYPERTENSION: ICD-10-CM

## 2024-04-16 PROCEDURE — 99214 OFFICE O/P EST MOD 30 MIN: CPT | Mod: 25

## 2024-04-16 PROCEDURE — G2211 COMPLEX E/M VISIT ADD ON: CPT | Mod: NC,1L

## 2024-04-16 PROCEDURE — 93000 ELECTROCARDIOGRAM COMPLETE: CPT

## 2024-04-16 RX ORDER — METOPROLOL SUCCINATE 25 MG/1
25 TABLET, EXTENDED RELEASE ORAL DAILY
Qty: 30 | Refills: 3 | Status: ACTIVE | COMMUNITY
Start: 2023-09-07

## 2024-04-16 NOTE — REASON FOR VISIT
[Arrhythmia/ECG Abnorrmalities] : arrhythmia/ECG abnormalities [Hypertension] : hypertension [FreeTextEntry3] : Scarascia

## 2024-04-16 NOTE — DISCUSSION/SUMMARY
[Patient] : the patient [Risks] : risks [Benefits] : benefits [Alternatives] : alternatives [___ Month(s)] : in [unfilled] month(s) [FreeTextEntry1] :  He is only taking 25 mg of metoprolol as higher dose made him very fatigued.  He will continue his current regimen therefore discussed ablation reassess with consultation with Dr. Pomap [EKG obtained to assist in diagnosis and management of assessed problem(s)] : EKG obtained to assist in diagnosis and management of assessed problem(s)

## 2024-04-16 NOTE — HISTORY OF PRESENT ILLNESS
[FreeTextEntry1] : Indicates has about 2 episodes of A-fib monthly can function when they happen does take extra diltiazem episodes can last up to 6 or 7 hours.  No syncope chest pain dyspnea.  He is treating his sleep apnea.  Has issues of constipation stress both family and work-related.

## 2024-04-16 NOTE — ASSESSMENT
[FreeTextEntry1] : Paroxysmal A-fib on rhythm control strategy currently.  Hypertension, LAUREN using CPAP.  Overweight.  Nonobstructive coronary atherosclerosis

## 2024-04-16 NOTE — CARDIOLOGY SUMMARY
[de-identified] : November 1, 2022 sinus rhythm September 7, 2023 sinus rhythm atrial abnormality April 16, 2024 sinus bradycardia [de-identified] : Stress test December 2019 no ischemia [de-identified] : Echo 2019 normal LV function [de-identified] : November 2021  nonobstructive CAD

## 2024-05-07 PROBLEM — M65.322 TRIGGER INDEX FINGER OF LEFT HAND: Status: ACTIVE | Noted: 2017-07-12

## 2024-05-07 PROBLEM — M65.331 TRIGGER MIDDLE FINGER OF RIGHT HAND: Status: ACTIVE | Noted: 2023-03-13

## 2024-05-07 PROBLEM — M65.321 TRIGGER INDEX FINGER OF RIGHT HAND: Status: ACTIVE | Noted: 2017-07-12

## 2024-05-07 PROBLEM — M65.332 TRIGGER MIDDLE FINGER OF LEFT HAND: Status: ACTIVE | Noted: 2021-10-20

## 2024-05-07 PROBLEM — M65.352 TRIGGER LITTLE FINGER OF LEFT HAND: Status: ACTIVE | Noted: 2023-03-13

## 2024-05-07 PROBLEM — M65.311 TRIGGER FINGER OF RIGHT THUMB: Status: ACTIVE | Noted: 2018-09-07

## 2024-05-07 PROBLEM — M65.312 TRIGGER FINGER OF LEFT THUMB: Status: ACTIVE | Noted: 2017-07-12

## 2024-05-07 PROBLEM — M65.342 TRIGGER RING FINGER OF LEFT HAND: Status: ACTIVE | Noted: 2021-10-20

## 2024-05-08 ENCOUNTER — APPOINTMENT (OUTPATIENT)
Dept: ORTHOPEDIC SURGERY | Facility: CLINIC | Age: 64
End: 2024-05-08
Payer: COMMERCIAL

## 2024-05-08 DIAGNOSIS — M65.322 TRIGGER FINGER, LEFT INDEX FINGER: ICD-10-CM

## 2024-05-08 DIAGNOSIS — M65.352 TRIGGER FINGER, LEFT LITTLE FINGER: ICD-10-CM

## 2024-05-08 DIAGNOSIS — M65.332 TRIGGER FINGER, LEFT MIDDLE FINGER: ICD-10-CM

## 2024-05-08 DIAGNOSIS — M65.311 TRIGGER THUMB, RIGHT THUMB: ICD-10-CM

## 2024-05-08 DIAGNOSIS — M65.312 TRIGGER THUMB, LEFT THUMB: ICD-10-CM

## 2024-05-08 DIAGNOSIS — M65.342 TRIGGER FINGER, LEFT RING FINGER: ICD-10-CM

## 2024-05-08 DIAGNOSIS — M65.321 TRIGGER FINGER, RIGHT INDEX FINGER: ICD-10-CM

## 2024-05-08 DIAGNOSIS — M65.331 TRIGGER FINGER, RIGHT MIDDLE FINGER: ICD-10-CM

## 2024-05-08 PROCEDURE — 20550 NJX 1 TENDON SHEATH/LIGAMENT: CPT | Mod: LT

## 2024-05-08 PROCEDURE — 99214 OFFICE O/P EST MOD 30 MIN: CPT | Mod: 25

## 2024-05-08 RX ORDER — LIDOCAINE HYDROCHLORIDE 10 MG/ML
1 INJECTION, SOLUTION INFILTRATION; PERINEURAL
Qty: 0 | Refills: 0 | Status: COMPLETED | OUTPATIENT
Start: 2024-05-08

## 2024-05-08 RX ORDER — BETAMETHA AC,SOD PHOS/WATER/PF 6 MG/ML
6 (3-3) VIAL (ML) INJECTION
Qty: 1 | Refills: 0 | Status: COMPLETED | OUTPATIENT
Start: 2024-05-08

## 2024-05-08 RX ADMIN — BETAMETHASONE ACETATE AND BETAMETHASONE SODIUM PHOSPHATE 1 MG/ML: 3; 3 INJECTION, SUSPENSION INTRA-ARTICULAR; INTRALESIONAL; INTRAMUSCULAR; SOFT TISSUE at 00:00

## 2024-05-08 RX ADMIN — LIDOCAINE HYDROCHLORIDE 0.5 %: 10 INJECTION, SOLUTION EPIDURAL; INFILTRATION; INTRACAUDAL; PERINEURAL at 00:00

## 2024-05-08 NOTE — PHYSICAL EXAM
[de-identified] : - Constitutional: This is a male in no obvious distress. - Psych: Patient is alert and oriented to person, place and time.  Patient has a normal mood and affect. - Cardiovascular: Normal pulses throughout the upper extremities.  No significant varicosities are noted in the upper extremities.  - Neuro: Strength and sensation are intact throughout the upper extremities.  Patient has normal coordination. - Respiratory:  Patient exhibits no evidence of shortness of breath or difficulty breathing. - Skin: No rashes, lesions, or other abnormalities are noted in the upper extremities.  ---  Examination of his right hand demonstrates mild swelling and tenderness along the A1 pulley of the middle finger.  There is mild triggering.  There is no triggering of the other digits.  He is neurovascularly intact distally.  Examination of his left hand demonstrates swelling and tenderness tenderness along the A1 pulley of the little finger.  There is triggering.  There is no triggering of the other digits.  He is neurovascularly intact distally.

## 2024-05-08 NOTE — DISCUSSION/SUMMARY
[FreeTextEntry1] : I had a discussion regarding today's visit, the diagnosis and treatment recommendations and options.  We also discussed changes since the last visit.  At this time, with regard to his left little finger, I discussed the option of a third cortisone injection. He opted and agreed to proceed with a cortisone injection at left little finger trigger finger versus surgical release.  He opted to proceed with a third cortisone injection.  He understands that if this does not provide him with long-term relief, then I would recommend surgical release.  We did briefly discuss the surgical procedure in the past.  With regard to his right middle finger, he defers a cortisone injection today and opted to proceed with observation.  If his symptoms worsen, then he will follow-up for a repeat cortisone injection or discuss scheduling surgical release.  The patient has agreed to the above plan of management and has expressed full understanding.  All questions were fully answered to the patient's satisfaction.  My cumulative time spent on today's visit was greater than 30 minutes and included: Preparation for the visit, review of the medical records, review of pertinent diagnostic studies, examination and counseling of the patient on the above diagnosis, treatment plan and prognosis, orders of diagnostic tests, medications and/or appropriate procedures and documentation in the medical records of today's visit.

## 2024-05-08 NOTE — ADDENDUM
[FreeTextEntry1] :  I, Vel Stephens, acted solely as a scribe for Dr. Clement on this date on 05/08/2024.

## 2024-05-08 NOTE — PROCEDURE
[FreeTextEntry1] : -  After a discussion of risks and benefits, the patient agreed to proceed with a cortisone injection.  -  Side: Left -  Finger: little finger trigger finger -  Medications: 0.5 cc of 1% Lidocaine and 1 cc of Celestone Soluspan, 6mg/cc, using sterile technique. -  Patient tolerated the procedure well, without complications. -  Patient was told that the symptoms may worsen for a day or two, and should then begin to improve. -  Instructions: Patient was instructed on activity modification for the next several days. -  Follow-up: According to his symptoms.

## 2024-05-08 NOTE — HISTORY OF PRESENT ILLNESS
[FreeTextEntry1] : 10 months status post left little finger trigger cortisone injection #2 and right middle finger trigger trigger cortisone injection #2.  He has also been previously given 2 cortisone injections at his left ring finger trigger, 1 cortisone injection at his left middle finger trigger finger, 2 cortisone injections at his right trigger thumb and 1 cortisone injection left index finger trigger finger.  He returns today, for left little finger triggering. He states that the last cortisone injection did provide his symptoms with relief. He also reports right middle finger triggering.   He is a pre-diabetic but he is not treated for Diabetes.

## 2024-05-08 NOTE — END OF VISIT
[FreeTextEntry3] : This note was written by Vel Stephens on 05/08/2024 acting solely as a scribe for Dr. Rebel Clement.   All medical record entries made by the Scribe were at my, Dr. Rebel Clement, direction and personally dictated by me on 05/08/2024. I have personally reviewed the chart and agree that the record accurately reflects my personal performance of the history, physical exam, assessment and plan.

## 2024-08-12 ENCOUNTER — NON-APPOINTMENT (OUTPATIENT)
Age: 64
End: 2024-08-12

## 2024-08-13 ENCOUNTER — NON-APPOINTMENT (OUTPATIENT)
Age: 64
End: 2024-08-13

## 2024-08-13 ENCOUNTER — APPOINTMENT (OUTPATIENT)
Dept: CARDIOLOGY | Facility: CLINIC | Age: 64
End: 2024-08-13
Payer: COMMERCIAL

## 2024-08-13 VITALS
HEIGHT: 72 IN | DIASTOLIC BLOOD PRESSURE: 70 MMHG | BODY MASS INDEX: 32.1 KG/M2 | WEIGHT: 237 LBS | HEART RATE: 72 BPM | SYSTOLIC BLOOD PRESSURE: 120 MMHG | OXYGEN SATURATION: 96 %

## 2024-08-13 DIAGNOSIS — Z86.39 PERSONAL HISTORY OF OTHER ENDOCRINE, NUTRITIONAL AND METABOLIC DISEASE: ICD-10-CM

## 2024-08-13 DIAGNOSIS — I10 ESSENTIAL (PRIMARY) HYPERTENSION: ICD-10-CM

## 2024-08-13 DIAGNOSIS — I25.10 ATHEROSCLEROTIC HEART DISEASE OF NATIVE CORONARY ARTERY W/OUT ANGINA PECTORIS: ICD-10-CM

## 2024-08-13 DIAGNOSIS — I48.0 PAROXYSMAL ATRIAL FIBRILLATION: ICD-10-CM

## 2024-08-13 PROCEDURE — 93000 ELECTROCARDIOGRAM COMPLETE: CPT

## 2024-08-13 PROCEDURE — 99214 OFFICE O/P EST MOD 30 MIN: CPT | Mod: 25

## 2024-08-13 PROCEDURE — G2211 COMPLEX E/M VISIT ADD ON: CPT | Mod: NC

## 2024-08-13 NOTE — PHYSICAL EXAM
[Well Developed] : well developed [Well Nourished] : well nourished [No Acute Distress] : no acute distress [Normal Conjunctiva] : normal conjunctiva [Normal Venous Pressure] : normal venous pressure [No Carotid Bruit] : no carotid bruit [Normal S1, S2] : normal S1, S2 [No Murmur] : no murmur [No Rub] : no rub [No Gallop] : no gallop [Clear Lung Fields] : clear lung fields [Good Air Entry] : good air entry [No Respiratory Distress] : no respiratory distress  [Soft] : abdomen soft [Non Tender] : non-tender [Normal Gait] : normal gait [No Edema] : no edema [No Cyanosis] : no cyanosis [No Rash] : no rash [Moves all extremities] : moves all extremities [No Focal Deficits] : no focal deficits [Normal Speech] : normal speech [Alert and Oriented] : alert and oriented [Normal memory] : normal memory

## 2024-08-13 NOTE — DISCUSSION/SUMMARY
[Patient] : the patient [Risks] : risks [Benefits] : benefits [Alternatives] : alternatives [___ Month(s)] : in [unfilled] month(s) [FreeTextEntry1] : He will continue his current regimen therefore discussed ablation reassess with consultation with Dr. Pompa.  Multiple questions addressed with patient and wife. [EKG obtained to assist in diagnosis and management of assessed problem(s)] : EKG obtained to assist in diagnosis and management of assessed problem(s)

## 2024-08-13 NOTE — HISTORY OF PRESENT ILLNESS
[FreeTextEntry1] : Periodic episodes of A-fib that can last for hours.  If they are prolonged he takes extra diltiazem short acting.  He is considering ablation has appointment for EP reassessment shortly.

## 2024-08-13 NOTE — CARDIOLOGY SUMMARY
[de-identified] : November 1, 2022 sinus rhythm September 7, 2023 sinus rhythm atrial abnormality April 16, 2024 sinus bradycardia August 13, 2024 sinus rhythm [de-identified] : Echo 2019 normal LV function [de-identified] : Stress test December 2019 no ischemia [de-identified] : November 2021  nonobstructive CAD

## 2024-09-04 ENCOUNTER — APPOINTMENT (OUTPATIENT)
Dept: ELECTROPHYSIOLOGY | Facility: CLINIC | Age: 64
End: 2024-09-04

## 2024-09-04 ENCOUNTER — NON-APPOINTMENT (OUTPATIENT)
Age: 64
End: 2024-09-04

## 2024-09-04 VITALS
HEART RATE: 58 BPM | BODY MASS INDEX: 31.83 KG/M2 | WEIGHT: 235 LBS | HEIGHT: 72 IN | SYSTOLIC BLOOD PRESSURE: 120 MMHG | DIASTOLIC BLOOD PRESSURE: 80 MMHG | OXYGEN SATURATION: 97 %

## 2024-09-04 PROCEDURE — 99214 OFFICE O/P EST MOD 30 MIN: CPT | Mod: 25

## 2024-09-04 PROCEDURE — 93000 ELECTROCARDIOGRAM COMPLETE: CPT

## 2024-09-04 NOTE — DISCUSSION/SUMMARY
[FreeTextEntry1] : Wants AF ablation - in october message to alejandro regarding lipids and echo     The patient is a 60-year-old man who has had paroxysmal atrial fibrillation over the last 3 years.  It appears that his episodes are increasing in frequency as well as duration.  The patient is aware of the episodes and mostly feels the fluttering and a sense of not feeling well during episodes.  His risk factors include obesity, obstructive sleep apnea and hypertension.  His blood pressure seems to be fairly well controlled on diltiazem.  He is not yet treated for sleep apnea.  Echocardiogram from 2019 showed preserved left ventricular function, normal right and left atrial size and normal valvular function.   [id number] score 1.  He is currently on Eliquis and has had no bleeding issues.  Patient is currently on diltiazem as well as Multaq.  He has not shown significant bradycardia.  He is not taking Multaq reliably.    His risk factors include stress/anxiety, increased BMI, alcohol use, obstructive sleep apnea without treatment, history of hypertension.     Options for treatment discussed with the patient: Catheter ablation versus antiarrhythmic medication.  The ablation procedure including the risk, successfully, recurrence rate, complication rates were all discussed.  The patient is not interested in proceeding ablation at this point but would consider it in the future.  He is very afraid of having the ablation done at this time. He would prefer to remain on antiarrhythmic.  We also discussed the benefits of aggressive risk factor modification including weight loss and treatment of the sleep apnea.  Patient will pursue treatment both of these risk factors.  Also discussed modification of his stress and anxiety which he will also proceed.  We will consider other antiarrhythmics such as low-dose flecainide if the Multaq is not effective. Prior to initiation of flecainide we should rule out coronary disease and would recommend a coronary CT.  . I have ordered a coronary CT scan.  We will have a follow-up discussion regarding other antiarrhythmic after he is initiated treatment for sleep apnea and the results of the CT scan are available.

## 2024-09-04 NOTE — HISTORY OF PRESENT ILLNESS
[FreeTextEntry1] : \par  \par  Patient is a 60-year-old man who was seen in evaluation regarding his paroxysmal  atrial fibrillation.  He was accompanied by spouse.\par  He has a prior history of hypertension.   There is no prior history of diabetes, stroke, TIA, thyroid disease or any known CAD.  His  [id number] score 1.  He has been on Eliquis. Patient has had significant amount of stress and anxiety.  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  His son was recently diagnosed with a mental illness and he is stressed over the situation.  \par  His symptoms started around 2018-19: racing heart beat/palpitations. AF with RVR documented in 2019 - [facility] ER.  Episodes now last  as much as 14 hours.  He has had recordings by his apple watch.  Some of the episodes were associated with increase in alcohol consumption.  He has occasional dizziness and mild shortness of breath no chest pain, syncope, presyncope.\par  \par  Patient seems to get a lot of episodes in the afternoon and at night include some episodes awaken from sleeping.    He now gets these episodes during the day and they occur in random fashions.  Occasionally he takes the diltiazem and seems to make it better.\par  He has been Multaq but has not been consistently taking it and has forgotten to take his dosages at times.  He might of had some improvement on the Multaq.\par  \par  He had uncomplicated Covid infection last year.\par  Test reviewed:\par  \par  EKGs  from March 2020  to  November 1, 2021 showed sinus rhythm.\par  \par  Treadmill stress test performed on 12/30/2019 [place] protocol exercise duration 9 minutes occasional VPCs no ischemic changes. No AF\par  \par  Echocardiogram performed 3/5/2019: LVEF 60 to 65%, normal left atrial size and volume.  Normal valvular function.  No pericardial effusion.\par  \par  Event monitor from 7/16/2019 reviewed sinus rhythm\par  \par  Sleep study performed on 9/8/2020. AHI 20. Mostly obstructive.  EKG showed brief periods of atrial fibrillation during the sleep study. He has not had CPAP treatment.\par  \par  He is not aware of a family history of A. fib.  He denies excessive caffeine intake.  Patient has moderate alcohol intake.

## 2024-09-12 DIAGNOSIS — I25.10 ATHEROSCLEROTIC HEART DISEASE OF NATIVE CORONARY ARTERY W/OUT ANGINA PECTORIS: ICD-10-CM

## 2024-09-12 DIAGNOSIS — I48.0 PAROXYSMAL ATRIAL FIBRILLATION: ICD-10-CM

## 2024-09-23 ENCOUNTER — APPOINTMENT (OUTPATIENT)
Dept: CARDIOLOGY | Facility: CLINIC | Age: 64
End: 2024-09-23
Payer: COMMERCIAL

## 2024-09-23 PROCEDURE — 93306 TTE W/DOPPLER COMPLETE: CPT

## 2024-10-10 ENCOUNTER — TRANSCRIPTION ENCOUNTER (OUTPATIENT)
Age: 64
End: 2024-10-10

## 2024-10-10 ENCOUNTER — RESULT REVIEW (OUTPATIENT)
Age: 64
End: 2024-10-10

## 2024-10-10 ENCOUNTER — INPATIENT (INPATIENT)
Facility: HOSPITAL | Age: 64
LOS: 0 days | Discharge: HOME | DRG: 310 | End: 2024-10-11
Attending: INTERNAL MEDICINE | Admitting: INTERNAL MEDICINE
Payer: COMMERCIAL

## 2024-10-10 VITALS
WEIGHT: 235.45 LBS | DIASTOLIC BLOOD PRESSURE: 89 MMHG | RESPIRATION RATE: 17 BRPM | OXYGEN SATURATION: 97 % | HEIGHT: 72 IN | SYSTOLIC BLOOD PRESSURE: 158 MMHG | TEMPERATURE: 98 F | HEART RATE: 70 BPM

## 2024-10-10 DIAGNOSIS — I48.0 PAROXYSMAL ATRIAL FIBRILLATION: ICD-10-CM

## 2024-10-10 DIAGNOSIS — Z90.89 ACQUIRED ABSENCE OF OTHER ORGANS: Chronic | ICD-10-CM

## 2024-10-10 DIAGNOSIS — Z90.49 ACQUIRED ABSENCE OF OTHER SPECIFIED PARTS OF DIGESTIVE TRACT: Chronic | ICD-10-CM

## 2024-10-10 LAB
ABO RH CONFIRMATION: SIGNIFICANT CHANGE UP
ANION GAP SERPL CALC-SCNC: 12 MMOL/L — SIGNIFICANT CHANGE UP (ref 5–17)
BLD GP AB SCN SERPL QL: SIGNIFICANT CHANGE UP
BUN SERPL-MCNC: 9.8 MG/DL — SIGNIFICANT CHANGE UP (ref 8–20)
CALCIUM SERPL-MCNC: 9.6 MG/DL — SIGNIFICANT CHANGE UP (ref 8.4–10.5)
CHLORIDE SERPL-SCNC: 102 MMOL/L — SIGNIFICANT CHANGE UP (ref 96–108)
CO2 SERPL-SCNC: 23 MMOL/L — SIGNIFICANT CHANGE UP (ref 22–29)
CREAT SERPL-MCNC: 1.08 MG/DL — SIGNIFICANT CHANGE UP (ref 0.5–1.3)
EGFR: 77 ML/MIN/1.73M2 — SIGNIFICANT CHANGE UP
GLUCOSE SERPL-MCNC: 117 MG/DL — HIGH (ref 70–99)
HCT VFR BLD CALC: 49.6 % — SIGNIFICANT CHANGE UP (ref 39–50)
HGB BLD-MCNC: 17.7 G/DL — HIGH (ref 13–17)
INR BLD: 1.07 RATIO — SIGNIFICANT CHANGE UP (ref 0.85–1.16)
MAGNESIUM SERPL-MCNC: 2.1 MG/DL — SIGNIFICANT CHANGE UP (ref 1.6–2.6)
MCHC RBC-ENTMCNC: 31.5 PG — SIGNIFICANT CHANGE UP (ref 27–34)
MCHC RBC-ENTMCNC: 35.7 GM/DL — SIGNIFICANT CHANGE UP (ref 32–36)
MCV RBC AUTO: 88.3 FL — SIGNIFICANT CHANGE UP (ref 80–100)
PLATELET # BLD AUTO: 272 K/UL — SIGNIFICANT CHANGE UP (ref 150–400)
POTASSIUM SERPL-MCNC: 4.7 MMOL/L — SIGNIFICANT CHANGE UP (ref 3.5–5.3)
POTASSIUM SERPL-SCNC: 4.7 MMOL/L — SIGNIFICANT CHANGE UP (ref 3.5–5.3)
PROTHROM AB SERPL-ACNC: 12.1 SEC — SIGNIFICANT CHANGE UP (ref 9.9–13.4)
RBC # BLD: 5.62 M/UL — SIGNIFICANT CHANGE UP (ref 4.2–5.8)
RBC # FLD: 12.4 % — SIGNIFICANT CHANGE UP (ref 10.3–14.5)
SODIUM SERPL-SCNC: 137 MMOL/L — SIGNIFICANT CHANGE UP (ref 135–145)
WBC # BLD: 10.02 K/UL — SIGNIFICANT CHANGE UP (ref 3.8–10.5)
WBC # FLD AUTO: 10.02 K/UL — SIGNIFICANT CHANGE UP (ref 3.8–10.5)

## 2024-10-10 PROCEDURE — 76376 3D RENDER W/INTRP POSTPROCES: CPT | Mod: 26

## 2024-10-10 PROCEDURE — 93320 DOPPLER ECHO COMPLETE: CPT | Mod: 26

## 2024-10-10 PROCEDURE — 93312 ECHO TRANSESOPHAGEAL: CPT | Mod: 26

## 2024-10-10 PROCEDURE — 93010 ELECTROCARDIOGRAM REPORT: CPT | Mod: 76

## 2024-10-10 PROCEDURE — 93325 DOPPLER ECHO COLOR FLOW MAPG: CPT | Mod: 26

## 2024-10-10 RX ORDER — FENOFIBRATE NANOCRYSTALLIZED 145 MG
1 TABLET ORAL
Refills: 0 | DISCHARGE

## 2024-10-10 RX ORDER — DRONEDARONE 400 MG/1
400 TABLET, FILM COATED ORAL
Refills: 0 | Status: DISCONTINUED | OUTPATIENT
Start: 2024-10-10 | End: 2024-10-11

## 2024-10-10 RX ORDER — ACETAMINOPHEN 325 MG
650 TABLET ORAL EVERY 6 HOURS
Refills: 0 | Status: DISCONTINUED | OUTPATIENT
Start: 2024-10-10 | End: 2024-10-11

## 2024-10-10 RX ORDER — APIXABAN 5 MG/1
5 TABLET, FILM COATED ORAL
Refills: 0 | Status: DISCONTINUED | OUTPATIENT
Start: 2024-10-10 | End: 2024-10-11

## 2024-10-10 RX ORDER — ALPRAZOLAM 0.5 MG/1
0.25 TABLET ORAL EVERY 6 HOURS
Refills: 0 | Status: DISCONTINUED | OUTPATIENT
Start: 2024-10-10 | End: 2024-10-11

## 2024-10-10 RX ORDER — SODIUM CHLORIDE 0.9 % (FLUSH) 0.9 %
1000 SYRINGE (ML) INJECTION
Refills: 0 | Status: COMPLETED | OUTPATIENT
Start: 2024-10-10 | End: 2024-10-11

## 2024-10-10 RX ORDER — DILTIAZEM HCL 300 MG
1 CAPSULE, EXTENDED RELEASE 24HR ORAL
Refills: 0 | DISCHARGE

## 2024-10-10 RX ORDER — OXYCODONE HYDROCHLORIDE 30 MG/1
5 TABLET, FILM COATED, EXTENDED RELEASE ORAL EVERY 6 HOURS
Refills: 0 | Status: DISCONTINUED | OUTPATIENT
Start: 2024-10-10 | End: 2024-10-11

## 2024-10-10 RX ORDER — ATORVASTATIN CALCIUM 10 MG/1
40 TABLET, FILM COATED ORAL AT BEDTIME
Refills: 0 | Status: DISCONTINUED | OUTPATIENT
Start: 2024-10-10 | End: 2024-10-11

## 2024-10-10 RX ORDER — ATORVASTATIN CALCIUM 10 MG/1
1 TABLET, FILM COATED ORAL
Refills: 0 | DISCHARGE

## 2024-10-10 RX ORDER — MAG HYDROX/ALUMINUM HYD/SIMETH 200-200-20
30 SUSPENSION, ORAL (FINAL DOSE FORM) ORAL EVERY 6 HOURS
Refills: 0 | Status: DISCONTINUED | OUTPATIENT
Start: 2024-10-10 | End: 2024-10-11

## 2024-10-10 RX ORDER — DRONEDARONE 400 MG/1
1 TABLET, FILM COATED ORAL
Refills: 0 | DISCHARGE

## 2024-10-10 RX ORDER — APIXABAN 5 MG/1
1 TABLET, FILM COATED ORAL
Refills: 0 | DISCHARGE

## 2024-10-10 RX ORDER — BENZOCAINE AND LEVOMENTHOL 200; 5 MG/G; MG/G
1 SPRAY TOPICAL EVERY 6 HOURS
Refills: 0 | Status: DISCONTINUED | OUTPATIENT
Start: 2024-10-10 | End: 2024-10-11

## 2024-10-10 RX ORDER — DILTIAZEM HCL 300 MG
240 CAPSULE, EXTENDED RELEASE 24HR ORAL DAILY
Refills: 0 | Status: DISCONTINUED | OUTPATIENT
Start: 2024-10-10 | End: 2024-10-11

## 2024-10-10 RX ADMIN — Medication 50 MILLILITER(S): at 21:00

## 2024-10-10 RX ADMIN — Medication 650 MILLIGRAM(S): at 16:37

## 2024-10-10 RX ADMIN — ATORVASTATIN CALCIUM 40 MILLIGRAM(S): 10 TABLET, FILM COATED ORAL at 21:00

## 2024-10-10 RX ADMIN — Medication 50 MILLILITER(S): at 14:23

## 2024-10-10 RX ADMIN — DRONEDARONE 400 MILLIGRAM(S): 400 TABLET, FILM COATED ORAL at 17:27

## 2024-10-10 RX ADMIN — APIXABAN 5 MILLIGRAM(S): 5 TABLET, FILM COATED ORAL at 17:27

## 2024-10-10 RX ADMIN — BENZOCAINE AND LEVOMENTHOL 1 LOZENGE: 200; 5 SPRAY TOPICAL at 14:32

## 2024-10-10 NOTE — PROGRESS NOTE ADULT - SUBJECTIVE AND OBJECTIVE BOX
PROCEDURE(S): Pulsed Field Ablation of Atrial Fibrillation    ELECTRPHYSIOLOGIST(S):  Gilmar Tena MD         COMPLICATIONS:  none        DISPOSITION:  observation     CONDITION: stable  EBL: <15mL    Pt doing well s/p atrial fibrillation ablation (PFA). Denies complaint.       MEDICATIONS  (STANDING):  apixaban 5 milliGRAM(s) Oral <User Schedule>  atorvastatin 40 milliGRAM(s) Oral at bedtime  diltiazem    milliGRAM(s) Oral daily  dronedarone 400 milliGRAM(s) Oral two times a day  sodium chloride 0.9%. 1000 milliLiter(s) (50 mL/Hr) IV Continuous <Continuous>    MEDICATIONS  (PRN):  acetaminophen     Tablet .. 650 milliGRAM(s) Oral every 6 hours PRN Mild Pain (1 - 3), Moderate Pain (4 - 6)  ALPRAZolam 0.25 milliGRAM(s) Oral every 6 hours PRN anxiety/insomnia  aluminum hydroxide/magnesium hydroxide/simethicone Suspension 30 milliLiter(s) Oral every 6 hours PRN Dyspepsia  oxyCODONE    IR 5 milliGRAM(s) Oral every 6 hours PRN Severe Pain (7 - 10)      Allergies    sulfa drugs (Rash)    Intolerances          Exam:   T(C): 36.8 (10-10-24 @ 07:12), Max: 36.8 (10-10-24 @ 07:12)  HR: 70  BP: 129/82  RR: 17   SpO2: 98% (10-10-24 @ 07:13) (97% - 98%)    VSS, NAD, A&O x 3  Card: S1/S2, RRR, no m/g/r  Resp: lungs CTA b/l  Abd: S/NT/ND  Groins: hemostatic sutures in place; sites C/D/I; no bleeding, hematoma, erythema, exudate or edema  Ext: no edema; distal pulses intact    I/Os: net + 2000cc    ECG:  SR @ 70bpm, narrow QRS     Assessment:   63-year-old man with pmhx of hypertension and symptomatic PAF with RVR who presented electively and now s/p AF ablation w/ PVI (PFA) and CTI (RF ablation) via b/l FV access.  Hemostasis achieved via b/l FO8 suture.    Plan:   Bedrest x 4 hours, then OOB with assistance and progress as tolerated.   Groin sutures to be removed by EP service in AM.   Pending groin status: Eliquis 5mg PO BID to resume at 1700 tonight.   DO NOT HOLD, INTERRUPT OR REVERSE ANTICOAGULATION WITHOUT EXPLICIT APPROVAL FROM EP SERVICE.    IVF w/ NS 50cc/hr until tomorrow morning at 0800.  Continue Diltiazem and Multaq.    Discontinue Toprol.   Continue other home medications.   Strict I/Os.  Please encourage incentive spirometry and ambulation once able.  Observation and monitoring on telemetry overnight with anticipated discharge in the AM and outpt follow up in 2-4 weeks.      PROCEDURE(S): Pulsed Field Ablation of Atrial Fibrillation    ELECTRPHYSIOLOGIST(S):  Gilmar Tena MD         COMPLICATIONS:  none        DISPOSITION:  observation     CONDITION: stable  EBL: <15mL    Pt doing well s/p AF ablation w/ PVI (PFA) and CTI (RF ablation) via b/l FV access.  Hemostasis achieved via b/l FO8 suture. Denies complaint.       MEDICATIONS  (STANDING):  apixaban 5 milliGRAM(s) Oral <User Schedule>  atorvastatin 40 milliGRAM(s) Oral at bedtime  diltiazem    milliGRAM(s) Oral daily  dronedarone 400 milliGRAM(s) Oral two times a day  sodium chloride 0.9%. 1000 milliLiter(s) (50 mL/Hr) IV Continuous <Continuous>    MEDICATIONS  (PRN):  acetaminophen     Tablet .. 650 milliGRAM(s) Oral every 6 hours PRN Mild Pain (1 - 3), Moderate Pain (4 - 6)  ALPRAZolam 0.25 milliGRAM(s) Oral every 6 hours PRN anxiety/insomnia  aluminum hydroxide/magnesium hydroxide/simethicone Suspension 30 milliLiter(s) Oral every 6 hours PRN Dyspepsia  oxyCODONE    IR 5 milliGRAM(s) Oral every 6 hours PRN Severe Pain (7 - 10)      Allergies    sulfa drugs (Rash)    Intolerances          Exam:   T(C): 36.8 (10-10-24 @ 07:12), Max: 36.8 (10-10-24 @ 07:12)  HR: 70  BP: 129/82  RR: 17   SpO2: 98% (10-10-24 @ 07:13) (97% - 98%)    VSS, NAD, A&O x 3  Card: S1/S2, RRR, no m/g/r  Resp: lungs CTA b/l  Abd: S/NT/ND  Groins: hemostatic sutures in place; sites C/D/I; no bleeding, hematoma, erythema, exudate or edema  Ext: no edema; distal pulses intact    I/Os: net + 2000cc    ECG:  SR @ 70bpm, narrow QRS     Assessment:   63-year-old man with pmhx of hypertension and symptomatic PAF with RVR who presented electively and now s/p AF ablation w/ PVI (PFA) and CTI (RF ablation) via b/l FV access.  Hemostasis achieved via b/l FO8 suture.    Plan:   Bedrest x 4 hours, then OOB with assistance and progress as tolerated.   Groin sutures to be removed by EP service in AM.   Pending groin status: Eliquis 5mg PO BID to resume at 1700 tonight.   DO NOT HOLD, INTERRUPT OR REVERSE ANTICOAGULATION WITHOUT EXPLICIT APPROVAL FROM EP SERVICE.    IVF w/ NS @ 50cc/hr until tomorrow morning at 0800.  Continue Diltiazem and Multaq.    Discontinue Toprol.   Continue other home medications.   Strict I/Os.  Please encourage incentive spirometry and ambulation once able.  Observation and monitoring on telemetry overnight with anticipated discharge in the AM and outpt follow up in 2-4 weeks.

## 2024-10-10 NOTE — H&P PST ADULT - NSICDXFAMILYHX_GEN_ALL_CORE_FT
FAMILY HISTORY:  Father  Still living? Unknown  FHx: heart disease, Age at diagnosis: 71-80    Mother  Still living? No  Family hx of lung cancer, Age at diagnosis: 51-60

## 2024-10-10 NOTE — DISCHARGE NOTE PROVIDER - CARE PROVIDER_API CALL
Gilmar Tena.  Cardiac Electrophysiology  [address] [place] [id number]  Phone: [phone]  Fax: [phone]  Follow Up Time: 1 week

## 2024-10-10 NOTE — DISCHARGE NOTE PROVIDER - NSDCFUSCHEDAPPT_GEN_ALL_CORE_FT
Gilmar Tena  [facility] Physician [facility]  ELECTROPH [address]  Scheduled Appointment: 10/16/2024    Ross Sandoval  [facility]  CARDIOLOGY [address]  Scheduled Appointment: 12/17/2024

## 2024-10-10 NOTE — PROGRESS NOTE ADULT - SUBJECTIVE AND OBJECTIVE BOX
Admission Criteria  Please admit the patient to the following service: CARDIOLOGY      Major Criteria:    - Continuous EKG monitoring is required for condition causing arrhythmia (hyperkalemia, etc)    - Significant volume load > 200 ml      Admit to: 3GUL     Patient is being admitted to the inpatient service due to high risk characteristics and need for further management/monitoring and is considered to be at a significantly increased risk of major adverse cardiac and vascular events if discharged.

## 2024-10-10 NOTE — H&P PST ADULT - ASSESSMENT
60-year-old man with pmhx of hypertension and symptomatic PAF with RVR who presents for AF ablation with Dr. Tena.    Currently on Multaq, Diltiazem 240mg, Toprol 25mg, and Eliquis 5mg BID.  Denies history of bleeding complications    Currently doing well and asymptomatic.  Confirms NPO > 8  hours.    Last dose of Eliquis yesterday 10/9 in AM.     - IV Heparin lock  - STAT labs and Type and Screen  - 2 units of PRBC on hold  - Consent to be obtained by KAYCEE MULLINS  - Maintain NPO status for procedure     63-year-old man with pmhx of hypertension and symptomatic PAF with RVR who presents for AF ablation with Dr. Tena.    Currently on Multaq, Diltiazem 240mg, Toprol 25mg, and Eliquis 5mg BID.  Denies history of bleeding complications    Currently doing well and asymptomatic.  Confirms NPO > 8  hours.    Last dose of Eliquis yesterday 10/9 in AM.     - IV Heparin lock  - STAT labs and Type and Screen  - 2 units of PRBC on hold  - Consent to be obtained by KAYCEE MULLINS  - Maintain NPO status for procedure

## 2024-10-10 NOTE — DISCHARGE NOTE PROVIDER - HOSPITAL COURSE
63-year-old man with pmhx of hypertension and symptomatic PAF with RVR who presented electively and now s/p AF ablation w/ PVI (PFA) and CTI (RF ablation) via b/l FV access.  The patient was observed overnight without event and was discharged home the following morning with a plan for outpatient follow up.

## 2024-10-10 NOTE — DISCHARGE NOTE PROVIDER - NSDCFUADDINST_GEN_ALL_CORE_FT
Discontinue Metoprolol. Continue Eliquis, Multaq and Diltiazem  Recommend outpatient post procedure follow-up with Dr. Tena as scheduled on 10/16/24.

## 2024-10-10 NOTE — DISCHARGE NOTE PROVIDER - NSDCMRMEDTOKEN_GEN_ALL_CORE_FT
atorvastatin 40 mg oral tablet: 1 tab(s) orally once a day (at bedtime)  DilTIAZem Hydrochloride  mg/24 hours oral capsule, extended release: 1 cap(s) orally once a day  Eliquis 5 mg oral tablet: 1 tab(s) orally 2 times a day  fenofibrate 134 mg oral capsule: 1 cap(s) orally once a day  Metoprolol Succinate ER 25 mg oral tablet, extended release: 1 tab(s) orally once a day  Multaq 400 mg oral tablet: 1 tab(s) orally 2 times a day   atorvastatin 40 mg oral tablet: 1 tab(s) orally once a day (at bedtime)  DilTIAZem Hydrochloride  mg/24 hours oral capsule, extended release: 1 cap(s) orally once a day  Eliquis 5 mg oral tablet: 1 tab(s) orally 2 times a day  fenofibrate 134 mg oral capsule: 1 cap(s) orally once a day  Multaq 400 mg oral tablet: 1 tab(s) orally 2 times a day

## 2024-10-11 ENCOUNTER — TRANSCRIPTION ENCOUNTER (OUTPATIENT)
Age: 64
End: 2024-10-11

## 2024-10-11 VITALS
OXYGEN SATURATION: 94 % | TEMPERATURE: 98 F | HEART RATE: 69 BPM | RESPIRATION RATE: 18 BRPM | DIASTOLIC BLOOD PRESSURE: 76 MMHG | SYSTOLIC BLOOD PRESSURE: 157 MMHG

## 2024-10-11 LAB
ANION GAP SERPL CALC-SCNC: 11 MMOL/L — SIGNIFICANT CHANGE UP (ref 5–17)
BUN SERPL-MCNC: 11.1 MG/DL — SIGNIFICANT CHANGE UP (ref 8–20)
CALCIUM SERPL-MCNC: 8.9 MG/DL — SIGNIFICANT CHANGE UP (ref 8.4–10.5)
CHLORIDE SERPL-SCNC: 105 MMOL/L — SIGNIFICANT CHANGE UP (ref 96–108)
CO2 SERPL-SCNC: 25 MMOL/L — SIGNIFICANT CHANGE UP (ref 22–29)
CREAT SERPL-MCNC: 1.05 MG/DL — SIGNIFICANT CHANGE UP (ref 0.5–1.3)
EGFR: 79 ML/MIN/1.73M2 — SIGNIFICANT CHANGE UP
GLUCOSE SERPL-MCNC: 130 MG/DL — HIGH (ref 70–99)
HCT VFR BLD CALC: 43.3 % — SIGNIFICANT CHANGE UP (ref 39–50)
HGB BLD-MCNC: 15 G/DL — SIGNIFICANT CHANGE UP (ref 13–17)
MAGNESIUM SERPL-MCNC: 2.1 MG/DL — SIGNIFICANT CHANGE UP (ref 1.8–2.6)
MCHC RBC-ENTMCNC: 31.1 PG — SIGNIFICANT CHANGE UP (ref 27–34)
MCHC RBC-ENTMCNC: 34.6 GM/DL — SIGNIFICANT CHANGE UP (ref 32–36)
MCV RBC AUTO: 89.6 FL — SIGNIFICANT CHANGE UP (ref 80–100)
PLATELET # BLD AUTO: 206 K/UL — SIGNIFICANT CHANGE UP (ref 150–400)
POTASSIUM SERPL-MCNC: 4.5 MMOL/L — SIGNIFICANT CHANGE UP (ref 3.5–5.3)
POTASSIUM SERPL-SCNC: 4.5 MMOL/L — SIGNIFICANT CHANGE UP (ref 3.5–5.3)
RBC # BLD: 4.83 M/UL — SIGNIFICANT CHANGE UP (ref 4.2–5.8)
RBC # FLD: 12.8 % — SIGNIFICANT CHANGE UP (ref 10.3–14.5)
SODIUM SERPL-SCNC: 141 MMOL/L — SIGNIFICANT CHANGE UP (ref 135–145)
WBC # BLD: 13.89 K/UL — HIGH (ref 3.8–10.5)
WBC # FLD AUTO: 13.89 K/UL — HIGH (ref 3.8–10.5)

## 2024-10-11 PROCEDURE — 93320 DOPPLER ECHO COMPLETE: CPT

## 2024-10-11 PROCEDURE — 93656 COMPRE EP EVAL ABLTJ ATR FIB: CPT

## 2024-10-11 PROCEDURE — 36415 COLL VENOUS BLD VENIPUNCTURE: CPT

## 2024-10-11 PROCEDURE — 86901 BLOOD TYPING SEROLOGIC RH(D): CPT

## 2024-10-11 PROCEDURE — 76376 3D RENDER W/INTRP POSTPROCES: CPT

## 2024-10-11 PROCEDURE — C1732: CPT

## 2024-10-11 PROCEDURE — 80048 BASIC METABOLIC PNL TOTAL CA: CPT

## 2024-10-11 PROCEDURE — 93005 ELECTROCARDIOGRAM TRACING: CPT

## 2024-10-11 PROCEDURE — C1769: CPT

## 2024-10-11 PROCEDURE — C1887: CPT

## 2024-10-11 PROCEDURE — 85027 COMPLETE CBC AUTOMATED: CPT

## 2024-10-11 PROCEDURE — 93655 ICAR CATH ABLTJ DSCRT ARRHYT: CPT

## 2024-10-11 PROCEDURE — 86900 BLOOD TYPING SEROLOGIC ABO: CPT

## 2024-10-11 PROCEDURE — 85610 PROTHROMBIN TIME: CPT

## 2024-10-11 PROCEDURE — C1894: CPT

## 2024-10-11 PROCEDURE — 83735 ASSAY OF MAGNESIUM: CPT

## 2024-10-11 PROCEDURE — 93010 ELECTROCARDIOGRAM REPORT: CPT

## 2024-10-11 PROCEDURE — C1759: CPT

## 2024-10-11 PROCEDURE — 93325 DOPPLER ECHO COLOR FLOW MAPG: CPT

## 2024-10-11 PROCEDURE — 93312 ECHO TRANSESOPHAGEAL: CPT

## 2024-10-11 PROCEDURE — C1766: CPT

## 2024-10-11 PROCEDURE — C1733: CPT

## 2024-10-11 PROCEDURE — C1893: CPT

## 2024-10-11 PROCEDURE — 86850 RBC ANTIBODY SCREEN: CPT

## 2024-10-11 RX ORDER — METOPROLOL TARTRATE 50 MG
1 TABLET ORAL
Refills: 0 | DISCHARGE

## 2024-10-11 NOTE — PROGRESS NOTE ADULT - SUBJECTIVE AND OBJECTIVE BOX
Pt doing well POD #1 s/p  AF ablation w/ PVI (PFA) and CTI (RF ablation) via b/l FV access. NO events overnight. Denies complaint.     EKG: sinus rhythm @ 77bpm, QRS 118ms, QTc ~480ms (manual calculation)  TELE:   Sinus rhythm, one run of 4 beat NSVT @ 130bpm    MEDICATIONS  (STANDING):  apixaban 5 milliGRAM(s) Oral <User Schedule>  atorvastatin 40 milliGRAM(s) Oral at bedtime  diltiazem    milliGRAM(s) Oral daily  dronedarone 400 milliGRAM(s) Oral two times a day    MEDICATIONS  (PRN):  acetaminophen     Tablet .. 650 milliGRAM(s) Oral every 6 hours PRN Mild Pain (1 - 3), Moderate Pain (4 - 6)  ALPRAZolam 0.25 milliGRAM(s) Oral every 6 hours PRN anxiety/insomnia  aluminum hydroxide/magnesium hydroxide/simethicone Suspension 30 milliLiter(s) Oral every 6 hours PRN Dyspepsia  benzocaine/menthol Lozenge 1 Lozenge Oral every 6 hours PRN Sore Throat  oxyCODONE    IR 5 milliGRAM(s) Oral every 6 hours PRN Severe Pain (7 - 10)      Allergies    sulfa drugs (Rash)    Intolerances      PAST MEDICAL & SURGICAL HISTORY:  Hypertension      Paroxysmal atrial fibrillation      History of appendectomy      S/P tonsillectomy          Vital Signs Last 24 Hrs  T(C): 36.6 (11 Oct 2024 08:08), Max: 36.7 (10 Oct 2024 23:26)  T(F): 97.9 (11 Oct 2024 08:08), Max: 98 (10 Oct 2024 23:26)  HR: 69 (11 Oct 2024 08:08) (63 - 83)  BP: 157/76 (11 Oct 2024 08:08) (124/76 - 157/76)  BP(mean): 94 (11 Oct 2024 05:00) (94 - 104)  RR: 18 (11 Oct 2024 08:08) (12 - 18)  SpO2: 94% (11 Oct 2024 08:08) (93% - 96%)    Parameters below as of 11 Oct 2024 08:08  Patient On (Oxygen Delivery Method): room air        Physical Exam:  Constitutional: NAD, AAOx3  Cardiovascular: +S1S2 RRR  Pulmonary: CTA b/l, unlabored  Abd: soft NTND +BS  Groins: C/D/I bilaterally; no bleeding, hematoma, edema  Extremities: no pedal edema, +distal pulses b/l  Neuro: CN II-XII grossly intact, WING x4 with strength and sensation intact and equal bilaterally; no new abnormal neurological findings as compared to baseline    LABS:                        15.0   13.89 )-----------( 206      ( 11 Oct 2024 05:09 )             43.3     10-11    141  |  105  |  11.1  ----------------------------<  130[H]  4.5   |  25.0  |  1.05    Ca    8.9      11 Oct 2024 05:09  Mg     2.1     10-11      PT/INR - ( 10 Oct 2024 07:00 )   PT: 12.1 sec;   INR: 1.07 ratio           Urinalysis Basic - ( 11 Oct 2024 05:09 )    Color: x / Appearance: x / SG: x / pH: x  Gluc: 130 mg/dL / Ketone: x  / Bili: x / Urobili: x   Blood: x / Protein: x / Nitrite: x   Leuk Esterase: x / RBC: x / WBC x   Sq Epi: x / Non Sq Epi: x / Bacteria: x      Assessment:   63-year-old man with pmhx of hypertension and symptomatic PAF with RVR who presented electively and now s/p AF ablation w/ PVI (PFA) and CTI (RF ablation) via b/l FV access.  Hemostasis achieved via b/l FO8 suture.  NO events overnight.  Denies complaint. B/l groin suture removed without incident    Plan:   Continue Eliquis 5mg PO BID  Continue Diltiazem and Multaq.    Discontinue Toprol.   Continue other home medications.   Pt instructed as to activity limitations - no lifting/pushing/pulling >10 lbs or strenuous exercise x 1 week.   Pt instructed as to access site care and f/up - written instructions included in d/c documents.  Outpt f/up in 2-4 weeks - office will contact pt to schedule.

## 2024-10-11 NOTE — DISCHARGE NOTE NURSING/CASE MANAGEMENT/SOCIAL WORK - PATIENT PORTAL LINK FT
You can access the FollowMyHealth Patient Portal offered by [facility] by registering at the following website: [email or web address]. By joining [facility]’s FollowMyHealth portal, you will also be able to view your health information using other applications (apps) compatible with our system.

## 2024-10-16 ENCOUNTER — APPOINTMENT (OUTPATIENT)
Dept: ELECTROPHYSIOLOGY | Facility: CLINIC | Age: 64
End: 2024-10-16

## 2024-10-16 VITALS
WEIGHT: 227 LBS | DIASTOLIC BLOOD PRESSURE: 62 MMHG | SYSTOLIC BLOOD PRESSURE: 114 MMHG | HEIGHT: 72 IN | BODY MASS INDEX: 30.75 KG/M2 | OXYGEN SATURATION: 97 % | HEART RATE: 65 BPM

## 2024-10-16 DIAGNOSIS — I48.0 PAROXYSMAL ATRIAL FIBRILLATION: ICD-10-CM

## 2024-10-16 PROBLEM — I10 ESSENTIAL (PRIMARY) HYPERTENSION: Chronic | Status: ACTIVE | Noted: 2024-10-10

## 2024-10-16 PROCEDURE — 93000 ELECTROCARDIOGRAM COMPLETE: CPT

## 2024-10-16 PROCEDURE — 99213 OFFICE O/P EST LOW 20 MIN: CPT | Mod: 25

## 2024-10-29 ENCOUNTER — RX RENEWAL (OUTPATIENT)
Age: 64
End: 2024-10-29

## 2024-11-07 ENCOUNTER — RX RENEWAL (OUTPATIENT)
Age: 64
End: 2024-11-07

## 2024-12-17 ENCOUNTER — APPOINTMENT (OUTPATIENT)
Dept: CARDIOLOGY | Facility: CLINIC | Age: 64
End: 2024-12-17
Payer: COMMERCIAL

## 2024-12-17 VITALS
BODY MASS INDEX: 32.23 KG/M2 | DIASTOLIC BLOOD PRESSURE: 82 MMHG | WEIGHT: 238 LBS | HEIGHT: 72 IN | SYSTOLIC BLOOD PRESSURE: 128 MMHG

## 2024-12-17 DIAGNOSIS — I10 ESSENTIAL (PRIMARY) HYPERTENSION: ICD-10-CM

## 2024-12-17 DIAGNOSIS — I48.0 PAROXYSMAL ATRIAL FIBRILLATION: ICD-10-CM

## 2024-12-17 PROCEDURE — G2211 COMPLEX E/M VISIT ADD ON: CPT

## 2024-12-17 PROCEDURE — 99214 OFFICE O/P EST MOD 30 MIN: CPT

## 2025-02-12 ENCOUNTER — APPOINTMENT (OUTPATIENT)
Dept: ELECTROPHYSIOLOGY | Facility: CLINIC | Age: 65
End: 2025-02-12

## 2025-02-12 ENCOUNTER — NON-APPOINTMENT (OUTPATIENT)
Age: 65
End: 2025-02-12

## 2025-02-12 VITALS
BODY MASS INDEX: 32.1 KG/M2 | WEIGHT: 237 LBS | SYSTOLIC BLOOD PRESSURE: 124 MMHG | DIASTOLIC BLOOD PRESSURE: 60 MMHG | HEIGHT: 72 IN | OXYGEN SATURATION: 97 % | HEART RATE: 76 BPM

## 2025-02-12 PROCEDURE — 99213 OFFICE O/P EST LOW 20 MIN: CPT | Mod: 25

## 2025-02-12 PROCEDURE — 93000 ELECTROCARDIOGRAM COMPLETE: CPT

## 2025-03-04 PROBLEM — Z98.890 S/P ABLATION OF ATRIAL FIBRILLATION: Status: ACTIVE | Noted: 2025-03-04

## 2025-03-05 ENCOUNTER — NON-APPOINTMENT (OUTPATIENT)
Age: 65
End: 2025-03-05

## 2025-03-07 ENCOUNTER — RX RENEWAL (OUTPATIENT)
Age: 65
End: 2025-03-07

## 2025-03-24 ENCOUNTER — RX RENEWAL (OUTPATIENT)
Age: 65
End: 2025-03-24

## 2025-04-15 ENCOUNTER — APPOINTMENT (OUTPATIENT)
Dept: CARDIOLOGY | Facility: CLINIC | Age: 65
End: 2025-04-15
Payer: MEDICARE

## 2025-04-15 VITALS
SYSTOLIC BLOOD PRESSURE: 158 MMHG | DIASTOLIC BLOOD PRESSURE: 72 MMHG | BODY MASS INDEX: 32.23 KG/M2 | WEIGHT: 238 LBS | HEIGHT: 72 IN | OXYGEN SATURATION: 97 % | HEART RATE: 89 BPM

## 2025-04-15 VITALS — SYSTOLIC BLOOD PRESSURE: 146 MMHG | DIASTOLIC BLOOD PRESSURE: 78 MMHG

## 2025-04-15 DIAGNOSIS — Z98.890 OTHER SPECIFIED POSTPROCEDURAL STATES: ICD-10-CM

## 2025-04-15 DIAGNOSIS — I25.10 ATHEROSCLEROTIC HEART DISEASE OF NATIVE CORONARY ARTERY W/OUT ANGINA PECTORIS: ICD-10-CM

## 2025-04-15 DIAGNOSIS — Z86.79 OTHER SPECIFIED POSTPROCEDURAL STATES: ICD-10-CM

## 2025-04-15 DIAGNOSIS — I48.0 PAROXYSMAL ATRIAL FIBRILLATION: ICD-10-CM

## 2025-04-15 DIAGNOSIS — I10 ESSENTIAL (PRIMARY) HYPERTENSION: ICD-10-CM

## 2025-04-15 PROCEDURE — G2211 COMPLEX E/M VISIT ADD ON: CPT

## 2025-04-15 PROCEDURE — 99214 OFFICE O/P EST MOD 30 MIN: CPT

## 2025-08-19 ENCOUNTER — APPOINTMENT (OUTPATIENT)
Dept: ELECTROPHYSIOLOGY | Facility: CLINIC | Age: 65
End: 2025-08-19

## 2025-08-19 VITALS
HEART RATE: 99 BPM | BODY MASS INDEX: 31.69 KG/M2 | HEIGHT: 72 IN | DIASTOLIC BLOOD PRESSURE: 84 MMHG | SYSTOLIC BLOOD PRESSURE: 136 MMHG | WEIGHT: 234 LBS | OXYGEN SATURATION: 96 %

## 2025-08-19 PROCEDURE — 99203 OFFICE O/P NEW LOW 30 MIN: CPT | Mod: 25

## 2025-08-19 PROCEDURE — 93000 ELECTROCARDIOGRAM COMPLETE: CPT
